# Patient Record
Sex: FEMALE | Race: WHITE | HISPANIC OR LATINO | Employment: UNEMPLOYED | URBAN - METROPOLITAN AREA
[De-identification: names, ages, dates, MRNs, and addresses within clinical notes are randomized per-mention and may not be internally consistent; named-entity substitution may affect disease eponyms.]

---

## 2017-01-11 ENCOUNTER — OFFICE VISIT (OUTPATIENT)
Dept: URGENT CARE | Facility: CLINIC | Age: 32
End: 2017-01-11
Payer: COMMERCIAL

## 2017-01-11 VITALS
HEIGHT: 62 IN | OXYGEN SATURATION: 96 % | DIASTOLIC BLOOD PRESSURE: 64 MMHG | RESPIRATION RATE: 14 BRPM | HEART RATE: 120 BPM | SYSTOLIC BLOOD PRESSURE: 110 MMHG | WEIGHT: 140 LBS | BODY MASS INDEX: 25.76 KG/M2 | TEMPERATURE: 100.5 F

## 2017-01-11 DIAGNOSIS — R51.9 HEADACHE, UNSPECIFIED HEADACHE TYPE: ICD-10-CM

## 2017-01-11 DIAGNOSIS — J10.1 INFLUENZA A: ICD-10-CM

## 2017-01-11 DIAGNOSIS — R50.9 FEVER CHILLS: ICD-10-CM

## 2017-01-11 LAB
FLUAV+FLUBV AG SPEC QL IA: NORMAL
INT CON NEG: NEGATIVE
INT CON POS: POSITIVE

## 2017-01-11 PROCEDURE — 87804 INFLUENZA ASSAY W/OPTIC: CPT | Performed by: FAMILY MEDICINE

## 2017-01-11 PROCEDURE — 99214 OFFICE O/P EST MOD 30 MIN: CPT | Performed by: FAMILY MEDICINE

## 2017-01-11 RX ORDER — ONDANSETRON 4 MG/1
4 TABLET, ORALLY DISINTEGRATING ORAL ONCE
Status: COMPLETED | OUTPATIENT
Start: 2017-01-11 | End: 2017-01-11

## 2017-01-11 RX ORDER — OSELTAMIVIR PHOSPHATE 75 MG/1
75 CAPSULE ORAL 2 TIMES DAILY
Qty: 10 CAP | Refills: 0 | Status: SHIPPED | OUTPATIENT
Start: 2017-01-11 | End: 2017-01-16

## 2017-01-11 RX ORDER — KETOROLAC TROMETHAMINE 30 MG/ML
30 INJECTION, SOLUTION INTRAMUSCULAR; INTRAVENOUS ONCE
Status: COMPLETED | OUTPATIENT
Start: 2017-01-11 | End: 2017-01-11

## 2017-01-11 RX ADMIN — ONDANSETRON 4 MG: 4 TABLET, ORALLY DISINTEGRATING ORAL at 09:00

## 2017-01-11 RX ADMIN — KETOROLAC TROMETHAMINE 30 MG: 30 INJECTION, SOLUTION INTRAMUSCULAR; INTRAVENOUS at 08:52

## 2017-01-11 NOTE — MR AVS SNAPSHOT
"        Margot GonzalezNiya   2017 8:15 AM   Office Visit   MRN: [id number]    Department:  [facility]   Dept Phone:  [phone]    Description:  Female : 1985   Provider:  Skinny Warren M.D.           Reason for Visit     URI uri , symptoms , vomitting x 2 days .      Allergies as of 2017     Allergen Noted Reactions    Bloodless 2014         You were diagnosed with     Influenza A   [375609]       Fever chills   [985625]       Headache, unspecified headache type   [6150764]         Vital Signs     Blood Pressure Pulse Temperature Respirations Height Weight    110/64 mmHg 120 38.1 °C (100.5 °F) 14 1.575 m (5' 2\") 63.504 kg (140 lb)    Body Mass Index Oxygen Saturation Last Menstrual Period Breastfeeding? Smoking Status       25.60 kg/m2 96% 2016 No Never Smoker        Basic Information     Date Of Birth Sex Race Ethnicity Preferred Language    1985 Female White  Origin (Peruvian,Djiboutian,Northern Irish,Jav[place], etc) English      Problem List              ICD-10-CM Priority Class Noted - Resolved    Labor and delivery, indication for care O75.9   2014 - Present    Bronchitis J40   2016 - Present      Health Maintenance        Date Due Completion Dates    IMM DTaP/Tdap/Td Vaccine (1 - Tdap) 2004 ---    PAP SMEAR 2006 ---    IMM INFLUENZA (1) 2016            Results     POCT Influenza A/B      Component    Rapid Influenza A-B    pos a    Internal Control Positive    Positive    Internal Control Negative    Negative                        Current Immunizations     Influenza Vaccine Quad Inj (Pf) 2014 10:15 AM    MMR/Varicella Combined Vaccine  Incomplete    Tdap Vaccine  Incomplete      Below and/or attached are the medications your provider expects you to take. Review all of your home medications and newly ordered medications with your provider and/or pharmacist. Follow medication instructions as directed by your provider " and/or pharmacist. Please keep your medication list with you and share with your provider. Update the information when medications are discontinued, doses are changed, or new medications (including over-the-counter products) are added; and carry medication information at all times in the event of emergency situations     Allergies:  BLOODLESS - (reactions not documented)               Medications  Valid as of: January 11, 2017 -  9:05 AM    Generic Name Brand Name Tablet Size Instructions for use    Acetaminophen   Take  by mouth.        Albuterol Sulfate (Aero Soln) albuterol 108 (90 BASE) MCG/ACT Inhale 2 Puffs by mouth every four hours as needed.        Albuterol Sulfate (Nebu Soln) PROVENTIL 2.5mg/3ml 3 mL by Nebulization route every four hours as needed for Shortness of Breath.        Albuterol Sulfate (Nebu Soln) PROVENTIL 2.5mg/3ml 3 mL by Nebulization route every four hours as needed for Shortness of Breath.        Albuterol Sulfate (Aero Soln) albuterol 108 (90 BASE) MCG/ACT Inhale 2 Puffs by mouth every 6 hours as needed for Shortness of Breath.        Cetirizine HCl (Tab) ZYRTEC 10 MG Take 1 Tab by mouth every day.        Dextromethorphan-Guaifenesin   Take  by mouth.        Ibuprofen (Tab) MOTRIN 600 MG Take 1 Tab by mouth every 6 hours as needed (Cramping).        Loratadine (Tab) CLARITIN 10 MG Take 10 mg by mouth every day.        Naproxen (Tab) NAPROSYN 500 MG Take 1 Tab by mouth 2 times a day, with meals.        Oseltamivir Phosphate (Cap) TAMIFLU 75 MG Take 1 Cap by mouth 2 times a day for 5 days.        Pseudoephedrine-Guaifenesin (TABLET SR 12 HR) Pseudoephedrine-Guaifenesin 120-1200 MG Take 1 Dose by mouth 2 Times a Day.        Pseudoephedrine-Guaifenesin   Take  by mouth.        .                 Medicines prescribed today were sent to:     [id number] - [place] - [address]    [address] [id number]    Phone: [phone] Fax: [phone]    Open 24 Hours?: No       Medication refill instructions:       If your prescription bottle indicates you have medication refills left, it is not necessary to call your provider’s office. Please contact your pharmacy and they will refill your medication.    If your prescription bottle indicates you do not have any refills left, you may request refills at any time through one of the following ways: The online "[employer] system (except Urgent Care), by calling your provider’s office, or by asking your pharmacy to contact your provider’s office with a refill request. Medication refills are processed only during regular business hours and may not be available until the next business day. Your provider may request additional information or to have a follow-up visit with you prior to refilling your medication.   *Please Note: Medication refills are assigned a new Rx number when refilled electronically. Your pharmacy may indicate that no refills were authorized even though a new prescription for the same medication is available at the pharmacy. Please request the medicine by name with the pharmacy before contacting your provider for a refill.           "[employer] Access Code: [email or web address]  Expires: 2/10/2017  9:05 AM    "[employer]  A secure, online tool to manage your health information     [employer]’s "[employer]® is a secure, online tool that connects you to your personalized health information from the privacy of your home -- day or night - making it very easy for you to manage your healthcare. Once the activation process is completed, you can even access your medical information using the "[employer] bryan, which is available for free in the Apple Bryan store or Google Play store.     "[employer] provides the following levels of access (as shown below):   My Chart Features   [facility]  Urgent  Care [facility]  Primary Care  Doctor   Email your healthcare team securely and privately 24/7 X X X    Manage  appointments: schedule your next appointment; view details of past/upcoming appointments X      Request prescription refills. X      View recent personal medical records, including lab and immunizations X X X X   View health record, including health history, allergies, medications X X X X   Read reports about your outpatient visits, procedures, consult and ER notes X X X X   See your discharge summary, which is a recap of your hospital and/or ER visit that includes your diagnosis, lab results, and care plan. X X       How to register for [employer]:  1. Go to  [email or web address].  2. Click on the Sign Up Now box, which takes you to the New Member Sign Up page. You will need to provide the following information:  a. Enter your [employer]oon Access Code exactly as it appears at the top of this page. (You will not need to use this code after you’ve completed the sign-up process. If you do not sign up before the expiration date, you must request a new code.)   b. Enter your date of birth.   c. Enter your home email address.   d. Click Submit, and follow the next screen’s instructions.  3. Create a Balloon ID. This will be your Balloon login ID and cannot be changed, so think of one that is secure and easy to remember.  4. Create a Balloon password. You can change your password at any time.  5. Enter your Password Reset Question and Answer. This can be used at a later time if you forget your password.   6. Enter your e-mail address. This allows you to receive e-mail notifications when new information is available in Balloon.  7. Click Sign Up. You can now view your health information.    For assistance activating your [employer]oon account, call [phone]

## 2017-07-13 ENCOUNTER — OFFICE VISIT (OUTPATIENT)
Dept: MEDICAL GROUP | Age: 32
End: 2017-07-13
Payer: COMMERCIAL

## 2017-07-13 VITALS
WEIGHT: 141.6 LBS | HEART RATE: 77 BPM | HEIGHT: 62 IN | OXYGEN SATURATION: 98 % | DIASTOLIC BLOOD PRESSURE: 70 MMHG | BODY MASS INDEX: 26.06 KG/M2 | TEMPERATURE: 97.9 F | SYSTOLIC BLOOD PRESSURE: 118 MMHG

## 2017-07-13 DIAGNOSIS — J01.10 ACUTE FRONTAL SINUSITIS, RECURRENCE NOT SPECIFIED: ICD-10-CM

## 2017-07-13 DIAGNOSIS — I10 BENIGN ESSENTIAL HTN: ICD-10-CM

## 2017-07-13 DIAGNOSIS — G43.109 MIGRAINE WITH AURA AND WITHOUT STATUS MIGRAINOSUS, NOT INTRACTABLE: ICD-10-CM

## 2017-07-13 DIAGNOSIS — Z23 NEED FOR VACCINATION: ICD-10-CM

## 2017-07-13 DIAGNOSIS — J03.91 RECURRENT TONSILLITIS: ICD-10-CM

## 2017-07-13 PROCEDURE — 99214 OFFICE O/P EST MOD 30 MIN: CPT | Performed by: FAMILY MEDICINE

## 2017-07-13 RX ORDER — SUMATRIPTAN 25 MG/1
25-100 TABLET, FILM COATED ORAL
Qty: 10 TAB | Refills: 3 | Status: SHIPPED | OUTPATIENT
Start: 2017-07-13 | End: 2017-11-13 | Stop reason: SDUPTHER

## 2017-07-13 RX ORDER — LORATADINE 10 MG/1
10 TABLET ORAL DAILY
Qty: 90 TAB | Refills: 0 | Status: SHIPPED | OUTPATIENT
Start: 2017-07-13 | End: 2019-07-01 | Stop reason: CLARIF

## 2017-07-13 NOTE — PROGRESS NOTES
This medical record contains text that has been entered with the assistance of computer voice recognition and dictation software.  Therefore, it may contain unintended errors in text, spelling, punctuation, or grammar    Chief Complaint   Patient presents with   • Other     see reason for visit       Margot Hoang is a 32 y.o. female here evaluation and management of: Recurrent tonsillitis, migraines, seasonal allergies/sinusitis      HPI:     Recurrent tonsillitis  Patient states that she often gets white exudates on bilateral tonsils. This is also causing snoring and difficulty breathing at night. This is also associated with recurrent infections.    Migraine with aura and without status migrainosus, not intractable  Patient states that she began having the sharp headache in the right temporal region which radiated to her eye about a year and a half ago. They are associated with photosensitivity and phonophobia. She also notices white halos. She denies any projectile vomiting, denies any weakness in any extremity, no numbness or tingling anywhere. She stopped taking birth control pills about 6 months ago. She does not smoke cigarettes.    Acute frontal sinusitis  Patient states she gets recurrent bouts of sinusitis, nasal congestion pressure, associated with runny nose bilateral ear fullness, as well as itchy eyes. She states that Zyrtec made her lips swell.    Current medicines (including changes today)  Current Outpatient Prescriptions   Medication Sig Dispense Refill   • SUMAtriptan (IMITREX) 25 MG Tab tablet Take 1-4 Tabs by mouth Once PRN for Migraine for up to 1 dose. 10 Tab 3   • loratadine (CLARITIN) 10 MG Tab Take 1 Tab by mouth every day. 90 Tab 0   • albuterol (VENTOLIN OR PROVENTIL) 108 (90 BASE) MCG/ACT Aero Soln inhalation aerosol Inhale 2 Puffs by mouth every four hours as needed. 1 Inhaler 0   • albuterol (PROVENTIL) 2.5mg/3ml Nebu Soln solution for nebulization 3 mL by Nebulization  "route every four hours as needed for Shortness of Breath. 75 mL 0   • cetirizine (ZYRTEC) 10 MG Tab Take 1 Tab by mouth every day. 14 Tab 0   • albuterol (PROVENTIL) 2.5mg/3ml Nebu Soln solution for nebulization 3 mL by Nebulization route every four hours as needed for Shortness of Breath. 75 mL 0   • Acetaminophen (TYLENOL PO) Take  by mouth.     • Pseudoephedrine-Guaifenesin (MUCINEX D PO) Take  by mouth.     • loratadine (CLARITIN) 10 MG Tab Take 10 mg by mouth every day.     • Dextromethorphan-Guaifenesin (CHEST CONGESTION/COUGH RELIEF PO) Take  by mouth.     • albuterol 108 (90 BASE) MCG/ACT Aero Soln inhalation aerosol Inhale 2 Puffs by mouth every 6 hours as needed for Shortness of Breath. 8.5 g 3   • naproxen (NAPROSYN) 500 MG Tab Take 1 Tab by mouth 2 times a day, with meals. 60 Tab 3   • Pseudoephedrine-Guaifenesin (MUCINEX D) 120-1200 MG TABLET SR 12 HR Take 1 Dose by mouth 2 Times a Day. 28 Tab 0   • ibuprofen (MOTRIN) 600 MG TABS Take 1 Tab by mouth every 6 hours as needed (Cramping). (Patient not taking: Reported on 9/14/2016) 30 Tab 1     No current facility-administered medications for this visit.     She  has a past medical history of ASTHMA.  She  has past surgical history that includes primary c section (1/16/2014).  Social History   Substance Use Topics   • Smoking status: Never Smoker    • Smokeless tobacco: Never Used   • Alcohol Use: No     Social History     Social History Narrative     History reviewed. No pertinent family history.  No family status information on file.         ROS  Please see hpi    All other systems reviewed and are negative     Objective:     Blood pressure 118/70, pulse 77, temperature 36.6 °C (97.9 °F), height 1.575 m (5' 2.01\"), weight 64.229 kg (141 lb 9.6 oz), SpO2 98 %. Body mass index is 25.89 kg/(m^2).  Physical Exam:    Constitutional: Alert, no distress.  Skin: Warm, dry, good turgor, no rashes in visible areas.  Eye: Equal, round and reactive, conjunctiva " clear, lids normal.  ENMT: Lips without lesions, good dentition, oropharynx clear.  Neck: Trachea midline, no masses, no thyromegaly. No cervical or supraclavicular lymphadenopathy.  Respiratory: Unlabored respiratory effort, lungs clear to auscultation, no wheezes, no ronchi.  Cardiovascular: Normal S1, S2, no murmur, no edema.  Abdomen: Soft, non-tender, no masses, no hepatosplenomegaly.  Psych: Alert and oriented x3, normal affect and mood.      Neuro:  CN II-XII checked and intact, DTRs 2+ throughout, intact sensation to light touch, vibration, normal gait, No focal deficits, Romberg Normal, Normal tandem gait, normal heel to shin, normal finger to nose.  Negative frontal release signs. [facility]                    Assessment and Plan:   The following treatment plan was discussed      1. Need for vaccination  Left before getting    - TDAP VACCINE =>8YO IM    2. Migraine with aura and without status migrainosus, not intractable  No clinical indication for neuroimaging    - SUMAtriptan (IMITREX) 25 MG Tab tablet; Take 1-4 Tabs by mouth Once PRN for Migraine for up to 1 dose.  Dispense: 10 Tab; Refill: 3    3. Acute frontal sinusitis, recurrence not specified  I explained the importance of preventing antibiotic resistance  Educated patient that Washing the nasal cavities with saline reduces postnasal drainage, removes secretions, and rinses away allergens and irritants.  Patient was encouraged to use nasal saline rinses prior to using intranasal steroid  Will add Zyrtec and steroid taper   Oral prednisone  20 mg twice daily for five days, followed by 20 mg daily for five days (ie, total of 10 days of treatment).    If symptoms persist will consider antibiotics  And Leukotriene D4 (LTD4) receptor blockers including montelukast or zafirlukast     - loratadine (CLARITIN) 10 MG Tab; Take 1 Tab by mouth every day.  Dispense: 90 Tab; Refill: 0    4. Recurrent tonsillitis    - REFERRAL TO ENT    5. Benign  essential HTN    - COMP METABOLIC PANEL; Future  - CBC WITH DIFFERENTIAL; Future  - LIPID PROFILE; Future      HEALTH MAINTENANCE: due for tdap and pap    Instructed to Follow up in clinic or ER for worsening symptoms, difficulty breathing, lack of expected recovery, or should new symptoms or problems arise.    Followup: Return in about 4 weeks (around 8/10/2017) for Reevaluation.       Once again this medical record contains text that has been entered with the assistance of computer voice recognition and dictation software.  Therefore, it may contain unintended errors in text, spelling, punctuation, or grammar

## 2017-07-13 NOTE — MR AVS SNAPSHOT
"        Margot GonzalezJoshEpi   2017 3:40 PM   Office Visit   MRN: [id number]    Department:  [address] [place] [address]   Dept Phone:  [phone]    Description:  Female : 1985   Provider:  Trina Braun M.D.           Reason for Visit     Other see reason for visit      Allergies as of 2017     Allergen Noted Reactions    Bloodless 2014         You were diagnosed with     Migraine with aura and without status migrainosus, not intractable   [997475]       Recurrent tonsillitis   [965150]       Acute frontal sinusitis, recurrence not specified   [7841325]       Need for vaccination   [782582]       Benign essential HTN   [012737]         Vital Signs     Blood Pressure Pulse Temperature Height Weight Body Mass Index    118/70 mmHg 77 36.6 °C (97.9 °F) 1.575 m (5' 2.01\") 64.229 kg (141 lb 9.6 oz) 25.89 kg/m2    Oxygen Saturation Smoking Status                98% Never Smoker           Basic Information     Date Of Birth Sex Race Ethnicity Preferred Language    1985 Female White  Origin (Khmer,South Sudanese,Austrian,Gibraltarian, etc) English      Problem List              ICD-10-CM Priority Class Noted - Resolved    Labor and delivery, indication for care O75.9   2014 - Present    Bronchitis J40   2016 - Present    Migraine with aura and without status migrainosus, not intractable G43.109   2017 - Present    Acute frontal sinusitis J01.10   2017 - Present    Recurrent tonsillitis J03.91   2017 - Present    Benign essential HTN I10   2017 - Present      Health Maintenance        Date Due Completion Dates    IMM DTaP/Tdap/Td Vaccine (1 - Tdap) 2004 ---    PAP SMEAR 2006 ---    IMM INFLUENZA (1) 2017            Current Immunizations     Influenza Vaccine Quad Inj (Pf) 2014 10:15 AM    MMR/Varicella Combined Vaccine  Incomplete    Tdap Vaccine  Deferred (Patient Schedule),  Incomplete      Below and/or attached are the " medications your provider expects you to take. Review all of your home medications and newly ordered medications with your provider and/or pharmacist. Follow medication instructions as directed by your provider and/or pharmacist. Please keep your medication list with you and share with your provider. Update the information when medications are discontinued, doses are changed, or new medications (including over-the-counter products) are added; and carry medication information at all times in the event of emergency situations     Allergies:  BLOODLESS - (reactions not documented)               Medications  Valid as of: July 13, 2017 -  4:55 PM    Generic Name Brand Name Tablet Size Instructions for use    Acetaminophen   Take  by mouth.        Albuterol Sulfate (Aero Soln) albuterol 108 (90 BASE) MCG/ACT Inhale 2 Puffs by mouth every four hours as needed.        Albuterol Sulfate (Nebu Soln) PROVENTIL 2.5mg/3ml 3 mL by Nebulization route every four hours as needed for Shortness of Breath.        Albuterol Sulfate (Nebu Soln) PROVENTIL 2.5mg/3ml 3 mL by Nebulization route every four hours as needed for Shortness of Breath.        Albuterol Sulfate (Aero Soln) albuterol 108 (90 BASE) MCG/ACT Inhale 2 Puffs by mouth every 6 hours as needed for Shortness of Breath.        Cetirizine HCl (Tab) ZYRTEC 10 MG Take 1 Tab by mouth every day.        Dextromethorphan-Guaifenesin   Take  by mouth.        Ibuprofen (Tab) MOTRIN 600 MG Take 1 Tab by mouth every 6 hours as needed (Cramping).        Loratadine (Tab) CLARITIN 10 MG Take 10 mg by mouth every day.        Loratadine (Tab) CLARITIN 10 MG Take 1 Tab by mouth every day.        Naproxen (Tab) NAPROSYN 500 MG Take 1 Tab by mouth 2 times a day, with meals.        Pseudoephedrine-Guaifenesin (TABLET SR 12 HR) Pseudoephedrine-Guaifenesin 120-1200 MG Take 1 Dose by mouth 2 Times a Day.        Pseudoephedrine-Guaifenesin   Take  by mouth.        SUMAtriptan Succinate (Tab) IMITREX  25 MG Take 1-4 Tabs by mouth Once PRN for Migraine for up to 1 dose.        .                 Medicines prescribed today were sent to:     [id number] - [place], [place] - [address]    [address] [place] [address]    Phone: [phone] Fax: [phone]    Open 24 Hours?: No      Medication refill instructions:       If your prescription bottle indicates you have medication refills left, it is not necessary to call your provider’s office. Please contact your pharmacy and they will refill your medication.    If your prescription bottle indicates you do not have any refills left, you may request refills at any time through one of the following ways: The online [employer] system (except Urgent Care), by calling your provider’s office, or by asking your pharmacy to contact your provider’s office with a refill request. Medication refills are processed only during regular business hours and may not be available until the next business day. Your provider may request additional information or to have a follow-up visit with you prior to refilling your medication.   *Please Note: Medication refills are assigned a new Rx number when refilled electronically. Your pharmacy may indicate that no refills were authorized even though a new prescription for the same medication is available at the pharmacy. Please request the medicine by name with the pharmacy before contacting your provider for a refill.        Your To Do List     Future Labs/Procedures Complete By Expires    CBC WITH DIFFERENTIAL  As directed 7/13/2018    COMP METABOLIC PANEL  As directed 7/13/2018    LIPID PROFILE  As directed 7/13/2018      Referral     A referral request has been sent to our patient care coordination department. Please allow 3-5 business days for us to process this request and contact you either by phone or mail. If you do not hear from us by the 5th business day, please call us at [phone].        Instructions    Cefalea  migrañosa  (Migraine Headache)  Fernando cefalea migrañosa es un dolor intenso y punzante en olive o ambos lados de la emerald. La migraña puede durar desde 30 minutos hasta varias horas.  CAUSAS   No siempre se conoce la causa exacta de la cefalea migrañosa. Sin embargo, pueden aparecer cuando los nervios del cerebro se irritan y liberan ciertas sustancias químicas que causan inflamación. [place] ocasiona dolor.  Existen también ciertos factores que pueden desencadenar las migrañas, maxwell los siguientes:  · Alcohol.  · Fumar.  · Estrés.  · La menstruación  · Quesos añejados.  · Los alimentos o las bebidas que contienen nitratos, glutamato, aspartamo o tiramina.  · Falta de sueño.  · Chocolate.  · Cafeína.  · Hambre.  · Actividad física extenuante.  · Fatiga.  · Medicamentos que se usan para tratar el dolor en el pecho (nitroglicerina), píldoras anticonceptivas, estrógeno y algunos medicamentos para la hipertensión arterial.  SIGNOS Y SÍNTOMAS  · Dolor en olive o ambos lados de la emerald.  · Dolor pulsante o punzante.  · Dolor intenso que impide realizar las actividades diarias.  · Dolor que se agrava por cualquier actividad física.  · Náuseas, vómitos o ambos.  · Mareos.  · Dolor con la exposición a las luces brillantes, a los ruidos santa o la actividad.  · Sensibilidad general a las luces brillantes, a los ruidos santa o a los olores.  Antes de sufrir fernando migraña, puede recibir señales de advertencia (aura). Un aura puede incluir:  · Salvatore las luces intermitentes.  · [place] puntos brillantes, halos o líneas en zigzag.  · Tener fernando visión en túnel o visión borrosa.  · Sensación de entumecimiento u hormigueo.  · Dificultad para hablar  · Debilidad muscular.  DIAGNÓSTICO   La cefalea migrañosa se diagnostica en función de lo siguiente:  · Síntomas.  · Examen físico.  · Fernando TC (tomografía computada) o resonancia magnética de la emerald. Estas pruebas de diagnóstico por imagen no pueden diagnosticar las migrañas, zayda pueden ayudar a  descartar otras causas de las cefaleas.  TRATAMIENTO  Le prescribirán medicamentos para aliviar el dolor y las náuseas. También podrán administrarse medicamentos para ayudar a prevenir las migrañas recurrentes.   INSTRUCCIONES PARA EL CUIDADO EN EL HOGAR  · Sólo tome medicamentos de venta angelique o recetados para calmar el dolor o el malestar, según las indicaciones de snider médico. No se recomienda usar los opiáceos a sandra plazo.  · Cuando tenga la migraña, acuéstese en un cuarto oscuro y tranquilo  · Lleve un registro diario para averiguar lo que puede provocar las cefaleas migrañosas. Por ejemplo, escriba:  ¨ Lo que usted come y josephine.  ¨ Cuánto tiempo duerme.  ¨ Algún cambio en snider dieta o en los medicamentos.  · Limite el consumo de bebidas alcohólicas.  · Si fuma, deje de hacerlo.  · Duerma entre 7 y 9 horas, o según las recomendaciones del médico.  · Limite el estrés.  · Mantenga las luces tenues si le molestan las luces brillantes y la migraña empeora.  SOLICITE ATENCIÓN MÉDICA DE INMEDIATO SI:   · La migraña se hace cada vez más intensa.  · Tiene fiebre.  · Presenta rigidez en el janis.  · Tiene pérdida de visión.  · Presenta debilidad muscular o pérdida del control muscular.  · Comienza a perder el equilibrio o tiene problemas para caminar.  · Sufre mareos o se desmaya.  · Tiene síntomas graves que son diferentes a los primeros síntomas.  ASEGÚRESE DE QUE:   · Comprende estas instrucciones.  · Controlará snider afección.  · Recibirá ayuda de inmediato si no mejora o si empeora.     Esta información no tiene maxwell fin reemplazar el consejo del médico. Asegúrese de hacerle al médico cualquier pregunta que tenga.     Document Released: 12/18/2006 Document Revised: 10/08/2014  Elsevier Interactive Patient Education ©2016 Elsevier Inc.            Evirx Access Code: [email or web address]  Expires: 8/10/2017  4:07 AM    Evirx  A secure, online tool to manage your health information     [employer] is a secure,  online tool that connects you to your personalized health information from the privacy of your home -- day or night - making it very easy for you to manage your healthcare. Once the activation process is completed, you can even access your medical information using the Dengi Online bryan, which is available for free in the [employer] Bryan store or Google Play store.     Dengi Online provides the following levels of access (as shown below):   My Chart Features   [facility] Doctor [facility]  Specialists [facility]  Urgent  Care Non-[facility]  Primary Care  Doctor   Email your healthcare team securely and privately 24/7 X X X    Manage appointments: schedule your next appointment; view details of past/upcoming appointments X      Request prescription refills. X      View recent personal medical records, including lab and immunizations X X X X   View health record, including health history, allergies, medications X X X X   Read reports about your outpatient visits, procedures, consult and ER notes X X X X   See your discharge summary, which is a recap of your hospital and/or ER visit that includes your diagnosis, lab results, and care plan. X X       How to register for Dengi Online:  1. Go to  [email or web address].  2. Click on the Sign Up Now box, which takes you to the New Member Sign Up page. You will need to provide the following information:  a. Enter your Dengi Online Access Code exactly as it appears at the top of this page. (You will not need to use this code after you’ve completed the sign-up process. If you do not sign up before the expiration date, you must request a new code.)   b. Enter your date of birth.   c. Enter your home email address.   d. Click Submit, and follow the next screen’s instructions.  3. Create a Dengi Online ID. This will be your Dengi Online login ID and cannot be changed, so think of one that is secure and easy to remember.  4. Create a Dengi Online password. You can change your password at any time.  5. Enter your  Password Reset Question and Answer. This can be used at a later time if you forget your password.   6. Enter your e-mail address. This allows you to receive e-mail notifications when new information is available in [employer]st.  7. Click Sign Up. You can now view your health information.    For assistance activating your [employer]st account, call [phone]

## 2017-07-13 NOTE — PATIENT INSTRUCTIONS
Cefalea migrañosa  (Migraine Headache)  Fernando cefalea migrañosa es un dolor intenso y punzante en olive o ambos lados de la emerald. La migraña puede durar desde 30 minutos hasta varias horas.  CAUSAS   No siempre se conoce la causa exacta de la cefalea migrañosa. Sin embargo, pueden aparecer cuando los nervios del cerebro se irritan y liberan ciertas sustancias químicas que causan inflamación. [place] ocasiona dolor.  Existen también ciertos factores que pueden desencadenar las migrañas, maxwell los siguientes:  · Alcohol.  · Fumar.  · Estrés.  · La menstruación  · Quesos añejados.  · Los alimentos o las bebidas que contienen nitratos, glutamato, aspartamo o tiramina.  · Falta de sueño.  · Chocolate.  · Cafeína.  · Hambre.  · Actividad física extenuante.  · Fatiga.  · Medicamentos que se usan para tratar el dolor en el pecho (nitroglicerina), píldoras anticonceptivas, estrógeno y algunos medicamentos para la hipertensión arterial.  SIGNOS Y SÍNTOMAS  · Dolor en olive o ambos lados de la emerald.  · Dolor pulsante o punzante.  · Dolor intenso que impide realizar las actividades diarias.  · Dolor que se agrava por cualquier actividad física.  · Náuseas, vómitos o ambos.  · Mareos.  · Dolor con la exposición a las luces brillantes, a los ruidos santa o la actividad.  · Sensibilidad general a las luces brillantes, a los ruidos santa o a los olores.  Antes de sufrir fernando migraña, puede recibir señales de advertencia (aura). Un aura puede incluir:  · Salvatore las luces intermitentes.  · Salvatore puntos brillantes, halos o líneas en zigzag.  · Tener fernando visión en túnel o visión borrosa.  · Sensación de entumecimiento u hormigueo.  · Dificultad para hablar  · Debilidad muscular.  DIAGNÓSTICO   La cefalea migrañosa se diagnostica en función de lo siguiente:  · Síntomas.  · Examen físico.  · Fernando TC (tomografía computada) o resonancia magnética de la emerald. Estas pruebas de diagnóstico por imagen no pueden diagnosticar las migrañas, zayda pueden  ayudar a descartar otras causas de las cefaleas.  TRATAMIENTO  Le prescribirán medicamentos para aliviar el dolor y las náuseas. También podrán administrarse medicamentos para ayudar a prevenir las migrañas recurrentes.   INSTRUCCIONES PARA EL CUIDADO EN EL HOGAR  · Sólo tome medicamentos de venta angelique o recetados para calmar el dolor o el malestar, según las indicaciones de snider médico. No se recomienda usar los opiáceos a sandra plazo.  · Cuando tenga la migraña, acuéstese en un cuarto oscuro y tranquilo  · Lleve un registro diario para averiguar lo que puede provocar las cefaleas migrañosas. Por ejemplo, escriba:  ¨ Lo que usted come y josephine.  ¨ Cuánto tiempo duerme.  ¨ Algún cambio en snider dieta o en los medicamentos.  · Limite el consumo de bebidas alcohólicas.  · Si fuma, deje de hacerlo.  · Duerma entre 7 y 9 horas, o según las recomendaciones del médico.  · Limite el estrés.  · Mantenga las luces tenues si le molestan las luces brillantes y la migraña empeora.  SOLICITE ATENCIÓN MÉDICA DE INMEDIATO SI:   · La migraña se hace cada vez más intensa.  · Tiene fiebre.  · Presenta rigidez en el janis.  · Tiene pérdida de visión.  · Presenta debilidad muscular o pérdida del control muscular.  · Comienza a perder el equilibrio o tiene problemas para caminar.  · Sufre mareos o se desmaya.  · Tiene síntomas graves que son diferentes a los primeros síntomas.  ASEGÚRESE DE QUE:   · Comprende estas instrucciones.  · Controlará snider afección.  · Recibirá ayuda de inmediato si no mejora o si empeora.     Esta información no tiene maxwell fin reemplazar el consejo del médico. Asegúrese de hacerle al médico cualquier pregunta que tenga.     Document Released: 12/18/2006 Document Revised: 10/08/2014  Elsevier Interactive Patient Education ©2016 Elsevier Inc.

## 2017-07-13 NOTE — ASSESSMENT & PLAN NOTE
Patient states that she often gets white exudates on bilateral tonsils. This is also causing snoring and difficulty breathing at night. This is also associated with recurrent infections.

## 2017-07-13 NOTE — ASSESSMENT & PLAN NOTE
Patient states that she began having the sharp headache in the right temporal region which radiated to her eye about a year and a half ago. They are associated with photosensitivity and phonophobia. She also notices white halos. She denies any projectile vomiting, denies any weakness in any extremity, no numbness or tingling anywhere. She stopped taking birth control pills about 6 months ago. She does not smoke cigarettes.

## 2017-07-13 NOTE — ASSESSMENT & PLAN NOTE
Patient states she gets recurrent bouts of sinusitis, nasal congestion pressure, associated with runny nose bilateral ear fullness, as well as itchy eyes. She states that Zyrtec made her lips swell.

## 2017-11-13 ENCOUNTER — OFFICE VISIT (OUTPATIENT)
Dept: URGENT CARE | Facility: CLINIC | Age: 32
End: 2017-11-13
Payer: COMMERCIAL

## 2017-11-13 VITALS
SYSTOLIC BLOOD PRESSURE: 114 MMHG | DIASTOLIC BLOOD PRESSURE: 64 MMHG | RESPIRATION RATE: 14 BRPM | HEIGHT: 62 IN | TEMPERATURE: 98.2 F | HEART RATE: 74 BPM | OXYGEN SATURATION: 97 %

## 2017-11-13 DIAGNOSIS — G43.109 MIGRAINE WITH AURA AND WITHOUT STATUS MIGRAINOSUS, NOT INTRACTABLE: ICD-10-CM

## 2017-11-13 DIAGNOSIS — G43.909 MIGRAINE WITHOUT STATUS MIGRAINOSUS, NOT INTRACTABLE, UNSPECIFIED MIGRAINE TYPE: ICD-10-CM

## 2017-11-13 DIAGNOSIS — R11.2 NAUSEA AND VOMITING, INTRACTABILITY OF VOMITING NOT SPECIFIED, UNSPECIFIED VOMITING TYPE: ICD-10-CM

## 2017-11-13 LAB
APPEARANCE UR: CLEAR
BILIRUB UR STRIP-MCNC: NORMAL MG/DL
COLOR UR AUTO: NORMAL
GLUCOSE UR STRIP.AUTO-MCNC: NORMAL MG/DL
INT CON NEG: NEGATIVE
INT CON POS: POSITIVE
KETONES UR STRIP.AUTO-MCNC: NORMAL MG/DL
LEUKOCYTE ESTERASE UR QL STRIP.AUTO: NORMAL
NITRITE UR QL STRIP.AUTO: NORMAL
PH UR STRIP.AUTO: 7 [PH] (ref 5–8)
POC URINE PREGNANCY TEST: NORMAL
PROT UR QL STRIP: NORMAL MG/DL
RBC UR QL AUTO: NORMAL
SP GR UR STRIP.AUTO: 1.01
UROBILINOGEN UR STRIP-MCNC: NORMAL MG/DL

## 2017-11-13 PROCEDURE — 81025 URINE PREGNANCY TEST: CPT | Performed by: PHYSICIAN ASSISTANT

## 2017-11-13 PROCEDURE — 81002 URINALYSIS NONAUTO W/O SCOPE: CPT | Performed by: PHYSICIAN ASSISTANT

## 2017-11-13 PROCEDURE — 99214 OFFICE O/P EST MOD 30 MIN: CPT | Performed by: PHYSICIAN ASSISTANT

## 2017-11-13 RX ORDER — ONDANSETRON 4 MG/1
4 TABLET, FILM COATED ORAL EVERY 4 HOURS PRN
Qty: 20 TAB | Refills: 0 | Status: SHIPPED | OUTPATIENT
Start: 2017-11-13 | End: 2019-07-01 | Stop reason: CLARIF

## 2017-11-13 RX ORDER — ONDANSETRON 4 MG/1
4 TABLET, ORALLY DISINTEGRATING ORAL ONCE
Status: COMPLETED | OUTPATIENT
Start: 2017-11-13 | End: 2017-11-13

## 2017-11-13 RX ORDER — SUMATRIPTAN 25 MG/1
25-100 TABLET, FILM COATED ORAL
Qty: 10 TAB | Refills: 0 | Status: SHIPPED | OUTPATIENT
Start: 2017-11-13 | End: 2019-07-01 | Stop reason: CLARIF

## 2017-11-13 RX ADMIN — ONDANSETRON 4 MG: 4 TABLET, ORALLY DISINTEGRATING ORAL at 19:25

## 2017-11-13 NOTE — LETTER
November 13, 2017         Patient: Margot Hoang   YOB: 1985   Date of Visit: 11/13/2017           To Whom it May Concern:    Margot Hoang was seen in my clinic on 11/13/2017. Please excuse his absence from 11/13/17-11/14/17.    If you have any questions or concerns, please don't hesitate to call.        Sincerely,           Odalis Szymanski P.A.-C.  Electronically Signed

## 2017-11-14 ASSESSMENT — ENCOUNTER SYMPTOMS
NUMBER OF EPISODES OF EMESIS TODAY: 1
NAUSEA: 0
ABDOMINAL PAIN: 0
VOMITING: 1
DIZZINESS: 0
DIARRHEA: 0
BLOOD IN STOOL: 0
FEVER: 0
CHANGE IN BOWEL HABIT: 1
HEADACHES: 0
MUSCULOSKELETAL NEGATIVE: 1
SHORTNESS OF BREATH: 0
CONSTIPATION: 0
CHILLS: 1

## 2017-11-14 NOTE — PROGRESS NOTES
"Subjective:      Margot Hoang is a 32 y.o. female who presents with Emesis (nvd x 2 days .pt has vomiting x 9 episodes . pt began having diarrhea .  needs work note .)        Patient is accompanied by her .     Emesis   This is a new problem. The current episode started yesterday. The problem occurs constantly. The problem has been unchanged. Associated symptoms include a change in bowel habit, chills and vomiting. Pertinent negatives include no abdominal pain, chest pain, fever, headaches or nausea. The symptoms are aggravated by drinking and eating. She has tried nothing for the symptoms.     Patient presents to urgent care reporting an acute onset of nausea and vomiting last night that has been constant all day today. One episode of \"loose\", non-bloody diarrhea today. No recent travel, recent use of antibiotics, or suspect food intake.     Review of Systems   Constitutional: Positive for chills. Negative for fever.   Respiratory: Negative for shortness of breath.    Cardiovascular: Negative for chest pain.   Gastrointestinal: Positive for change in bowel habit and vomiting. Negative for abdominal pain, blood in stool, constipation, diarrhea and nausea.   Genitourinary: Negative.    Musculoskeletal: Negative.    Neurological: Negative for dizziness and headaches.          Objective:     /64   Pulse 74   Temp 36.8 °C (98.2 °F)   Resp 14   Ht 1.575 m (5' 2\")   LMP 11/06/2017   SpO2 97%   Breastfeeding? No      Physical Exam   Constitutional: She is oriented to person, place, and time. She appears well-developed and well-nourished. No distress.   HENT:   Head: Normocephalic and atraumatic.   Eyes: Pupils are equal, round, and reactive to light.   Neck: Normal range of motion.   Cardiovascular: Normal rate.    Pulmonary/Chest: Effort normal.   Abdominal: Soft. Bowel sounds are normal. She exhibits no distension and no mass. There is no tenderness. There is no guarding.   No CVAT " bilaterally   Musculoskeletal: Normal range of motion.   Neurological: She is alert and oriented to person, place, and time.   Skin: Skin is warm and dry. She is not diaphoretic.   Psychiatric: She has a normal mood and affect. Her behavior is normal.   Nursing note and vitals reviewed.            POCT Urinalysis:  Component Results     Component Value Ref Range & Units Status   POC Color nan Negative Final   POC Appearance clear Negative Final   POC Leukocyte Esterase neg Negative Final   POC Nitrites neg Negative Final   POC Urobiligen mod Negative (0.2) mg/dL Final   POC Protein mod Negative mg/dL Final   POC Urine PH 7.0 5.0 - 8.0 Final   POC Blood tr Negative Final   POC Specific Gravity 1.010 <1.005 - >1.030 Final   POC Ketones mod Negative mg/dL Final   POC Biliruben neg Negative mg/dL Final   POC Glucose neg Negative mg/dL Final   Last Resulted Time   Mon Nov 13, 2017  7:28 PM     Assessment/Plan:     1. Nausea and vomiting, intractability of vomiting not specified, unspecified vomiting type  - POCT Urinalysis --> no evidence of UTI  - POCT Pregnancy --> negative  - ondansetron (ZOFRAN ODT) dispertab 4 mg; Take 1 Tab by mouth Once.   - Given in clinic with mild relief of symptoms  - ondansetron (ZOFRAN) 4 MG Tab tablet; Take 1 Tab by mouth every four hours as needed for Nausea/Vomiting.  Dispense: 20 Tab; Refill: 0\    Advised patient symptoms are most likely viral in etiology, recommend supportive care. Encouraged snacking on small, bland meals. Call or return to office if symptoms persist or worsen. The patient demonstrated a good understanding and agreed with the treatment plan.    2. Migraine without status migrainosus, not intractable, unspecified migraine type  - SUMAtriptan (IMITREX) 25 MG Tab tablet; Take 1-4 Tabs by mouth Once PRN for Migraine for up to 1 dose.  Dispense: 10 Tab; Refill: 0    Patient reports a migraine starting today due to the nausea ad vomiting and is requesting a refill of  imitrex. Small refill given a today's visit, encouraged to follow up with PCP for ongoing management of migraines. The patient demonstrated a good understanding and agreed with the treatment plan.

## 2018-02-21 ENCOUNTER — OFFICE VISIT (OUTPATIENT)
Dept: URGENT CARE | Facility: CLINIC | Age: 33
End: 2018-02-21
Payer: COMMERCIAL

## 2018-02-21 VITALS
OXYGEN SATURATION: 99 % | SYSTOLIC BLOOD PRESSURE: 112 MMHG | DIASTOLIC BLOOD PRESSURE: 74 MMHG | HEIGHT: 62 IN | TEMPERATURE: 99.4 F | RESPIRATION RATE: 16 BRPM | WEIGHT: 142 LBS | BODY MASS INDEX: 26.13 KG/M2 | HEART RATE: 88 BPM

## 2018-02-21 DIAGNOSIS — M62.830 BACK MUSCLE SPASM: ICD-10-CM

## 2018-02-21 DIAGNOSIS — M54.31 SCIATICA OF RIGHT SIDE: ICD-10-CM

## 2018-02-21 PROCEDURE — 99203 OFFICE O/P NEW LOW 30 MIN: CPT | Mod: 25 | Performed by: EMERGENCY MEDICINE

## 2018-02-21 RX ORDER — CYCLOBENZAPRINE HCL 5 MG
5-10 TABLET ORAL 3 TIMES DAILY PRN
Qty: 20 TAB | Refills: 0 | Status: SHIPPED | OUTPATIENT
Start: 2018-02-21 | End: 2019-07-01 | Stop reason: CLARIF

## 2018-02-21 RX ORDER — KETOROLAC TROMETHAMINE 30 MG/ML
30 INJECTION, SOLUTION INTRAMUSCULAR; INTRAVENOUS ONCE
Status: COMPLETED | OUTPATIENT
Start: 2018-02-21 | End: 2018-02-21

## 2018-02-21 RX ORDER — MELOXICAM 7.5 MG/1
7.5 TABLET ORAL DAILY
Qty: 7 TAB | Refills: 0 | Status: SHIPPED | OUTPATIENT
Start: 2018-02-21 | End: 2019-07-01 | Stop reason: CLARIF

## 2018-02-21 RX ADMIN — KETOROLAC TROMETHAMINE 30 MG: 30 INJECTION, SOLUTION INTRAMUSCULAR; INTRAVENOUS at 12:29

## 2018-02-21 ASSESSMENT — ENCOUNTER SYMPTOMS
TINGLING: 0
NUMBNESS: 0
BACK PAIN: 1
PARESIS: 0
FEVER: 0
BOWEL INCONTINENCE: 0
SENSORY CHANGE: 0
FOCAL WEAKNESS: 0
ABDOMINAL PAIN: 0

## 2018-02-21 NOTE — LETTER
February 21, 2018         Patient: Margot Hoang   YOB: 1985   Date of Visit: 2/21/2018           To Whom it May Concern:    Margot Hoang was seen in my clinic on 2/21/2018. She may return to work on 2/24/2018.    If you have any questions or concerns, please don't hesitate to call.        Sincerely,           Hung Cowart M.D.  Electronically Signed

## 2018-02-21 NOTE — PATIENT INSTRUCTIONS
Stop ibuprofen. Begin new meloxicam/Mobic 8 hours after injection medication. She may begin cyclobenzaprine/Flexeril immediately.    Detener el ibuprofeno. Comience a alex nueva Meloxicam / Mobic 8 horas después de la inyección. Sarai puede comenzar cyclobenzaprine / Flexeril inmediatamente.        Muscle Cramps and Spasms  Muscle cramps and spasms are when muscles tighten by themselves. They usually get better within minutes. Muscle cramps are painful. They are usually stronger and last longer than muscle spasms. Muscle spasms may or may not be painful. They can last a few seconds or much longer.  HOME CARE  · Drink enough fluid to keep your pee (urine) clear or pale yellow.  · Massage, stretch, and relax the muscle.  · Use a warm towel, heating pad, or warm shower water on tight muscles.  · Place ice on the muscle if it is tender or in pain.  ¨ Put ice in a plastic bag.  ¨ Place a towel between your skin and the bag.  ¨ Leave the ice on for 15-20 minutes, 03-04 times a day.  · Only take medicine as told by your doctor.  GET HELP RIGHT AWAY IF:   Your cramps or spasms get worse, happen more often, or do not get better with time.  MAKE SURE YOU:  · Understand these instructions.  · Will watch your condition.  · Will get help right away if you are not doing well or get worse.     This information is not intended to replace advice given to you by your health care provider. Make sure you discuss any questions you have with your health care provider.     Document Released: 11/30/2009 Document Revised: 04/14/2014 Document Reviewed: 12/04/2013  [employer] Interactive Patient Education ©2016 [employer]    Calambres y espasmos musculares   (Muscle Cramps and Spasms)   Se denominan calambres y espasmos musculares cuando los músculos se comprimen. Generalmente mejoran en unos minutos. Los calambres musculares son dolorosos. Son más santa y tompkins más que los espasmos musculares. Los espasmos pueden o no ser dolorosos.  Pueden durar algunos segundos o mucho más.  CUIDADOS EN EL HOGAR   · Valarie gran cantidad de líquido para mantener el pis (orina) de annette saima o amarillo pálido.  · Masajee, estire y relaje el músculo.  · Aplique fernando toalla húmeda, fernando almohadilla térmica o moje con agua tibia los músculos acalambrados.  · Coloque fernando bolsa de hielo sobre la elida si le duele o está inflamada.  ¨ Ponga el hielo en fernando bolsa plástica.  ¨ Colóquese fernando toalla entre la piel y la bolsa de hielo.  ¨ Deje el hielo en el lugar sanchez 15 a 20 minutos, 3 a 4 veces por día.  · Sólo tome los medicamentos que le indique el médico.  SOLICITE AYUDA DE INMEDIATO SI:   Los calambres o espasmos empeoran, ocurren con más frecuencia o no mejoran con el tiempo.   ASEGÚRESE DE QUE:   · Comprende estas instrucciones.  · Controlará snider enfermedad.  · Solicitará ayuda de inmediato si no mejora o si empeora.     Esta información no tiene maxwell fin reemplazar el consejo del médico. Asegúrese de hacerle al médico cualquier pregunta que tenga.     Document Released: 03/16/2010 Document Revised: 04/14/2014  Elsevier Interactive Patient Education ©2016 [employer]    Sciatica  Sciatica is pain, weakness, numbness, or tingling along your sciatic nerve. The nerve starts in the lower back and runs down the back of each leg. Nerve damage or certain conditions pinch or put pressure on the sciatic nerve. This causes the pain, weakness, and other discomforts of sciatica.  HOME CARE   · Only take medicine as told by your doctor.  · Apply ice to the affected area for 20 minutes. Do this 3-4 times a day for the first 48-72 hours. Then try heat in the same way.  · Exercise, stretch, or do your usual activities if these do not make your pain worse.  · Go to physical therapy as told by your doctor.  · Keep all doctor visits as told.  · Do not wear high heels or shoes that are not supportive.  · Get a firm mattress if your mattress is too soft to lessen pain and  discomfort.  GET HELP RIGHT AWAY IF:   · You cannot control when you poop (bowel movement) or pee (urinate).  · You have more weakness in your lower back, lower belly (pelvis), butt (buttocks), or legs.  · You have redness or puffiness (swelling) of your back.  · You have a burning feeling when you pee.  · You have pain that gets worse when you lie down.  · You have pain that wakes you from your sleep.  · Your pain is worse than past pain.  · Your pain lasts longer than 4 weeks.  · You are suddenly losing weight without reason.  MAKE SURE YOU:   · Understand these instructions.  · Will watch this condition.  · Will get help right away if you are not doing well or get worse.     This information is not intended to replace advice given to you by your health care provider. Make sure you discuss any questions you have with your health care provider.     Document Released: 09/26/2009 Document Revised: 06/18/2013 Document Reviewed: 04/28/2013  [employer] Interactive Patient Education ©2016 Else[employer] Inc.    Ciática   (Sciatica)   La ciática es el dolor, debilidad, entumecimiento u hormigueo a lo sandra del nervio ciático. El nervio comienza en la elida inferior de la espalda y desciende por la parte posterior de cada pierna. Darlin lesión en los nervios o ciertas enfermedades pueden causar un pellizco o ejercer presión sobre el nervio ciático. [place] causa dolor, debilidad y otras molestias de ciática.   CUIDADOS EN EL HOGAR   · Sólo tome los medicamentos según le indique el médico.  · Aplique hielo en el área afectada sanchez 20 minutos. Repita 3-4 veces al día sanchez las primeras 48-72 horas. Luego intente aplicar calor de la misma manera.  · Bryant ejercicios, elongue o realice kathryn actividades habituales, si no le causan más dolor.  · Concurra a fisioterapia según lo indicado por snider médico.  · Cumpla con las visitas al médico según las indicaciones.  · No use tacones altos o zapatos que no tengan buen apoyo.  · Consiga un colchón  firme si snider colchón es demasiado blando para disminuir el dolor y el malestar.  SOLICITE AYUDA DE INMEDIATO SI:   · No puede controlar snider materia fecal (movimientos intestinales) o el pis orina.  · Siente debilidad en la elida inferior de la espalda, bajo vientre (pelvis), en las nalgas (glúteos).  · Siente irritación o hinchazón (inflamación) en la espalda.  · Tiene sensación de ardor al orinar.  · El dolor empeora cuando se acuesta.  · El dolor lo despierta al dormir.  · Snider dolor es más intenso que en el pasado.  · Los síntomas tompkins más de 4 semanas.  · Pierde peso sin motivo de manera súbita.  ASEGÚRESE DE QUE:   · Comprende estas instrucciones.  · Controlará la enfermedad.  · Solicitará ayuda de inmediato si no mejora o si empeora.     Esta información no tiene maxwell fin reemplazar el consejo del médico. Asegúrese de hacerle al médico cualquier pregunta que tenga.     Document Released: 01/20/2012 Document Revised: 06/18/2013  [employer] Interactive Patient Education ©2016 Else[employer] Inc.

## 2018-02-21 NOTE — PROGRESS NOTES
Subjective:      Margot Hoang is a 33 y.o. female who presents with Low Back Pain (pt states she went to the chiropatric and states her pain started then x 3 days)            Back Pain   This is a new problem. Episode onset: 3 days. The problem occurs daily. The problem has been gradually worsening since onset. The pain is present in the lumbar spine and gluteal. The pain radiates to the right thigh. The pain is severe. The symptoms are aggravated by bending, twisting, sitting and standing. Pertinent negatives include no abdominal pain, bladder incontinence, bowel incontinence, fever, numbness, paresis or tingling. She has tried chiropractic manipulation and NSAIDs for the symptoms. The treatment provided no relief.   Note similar episode a few years ago resolved. No new trauma. Gradually increasing pain at onset; worsened after chiropractic. History and examination facilitated by  as .    Review of Systems   Constitutional: Negative for fever.   Gastrointestinal: Negative for abdominal pain and bowel incontinence.   Genitourinary: Negative for bladder incontinence.        Denies pregnancy or breast-feeding.   Musculoskeletal: Positive for back pain.   Skin: Negative for rash.   Neurological: Negative for tingling, sensory change, focal weakness and numbness.     PMH:  has a past medical history of ASTHMA.  MEDS:   Current Outpatient Prescriptions:   •  meloxicam (MOBIC) 7.5 MG Tab, Take 1 Tab by mouth every day., Disp: 7 Tab, Rfl: 0  •  cyclobenzaprine (FLEXERIL) 5 MG tablet, Take 1-2 Tabs by mouth 3 times a day as needed., Disp: 20 Tab, Rfl: 0  •  ibuprofen (MOTRIN) 600 MG TABS, Take 1 Tab by mouth every 6 hours as needed (Cramping)., Disp: 30 Tab, Rfl: 1  •  ondansetron (ZOFRAN) 4 MG Tab tablet, Take 1 Tab by mouth every four hours as needed for Nausea/Vomiting., Disp: 20 Tab, Rfl: 0  •  SUMAtriptan (IMITREX) 25 MG Tab tablet, Take 1-4 Tabs by mouth Once PRN for Migraine for  up to 1 dose., Disp: 10 Tab, Rfl: 0  •  loratadine (CLARITIN) 10 MG Tab, Take 1 Tab by mouth every day., Disp: 90 Tab, Rfl: 0  •  Acetaminophen (TYLENOL PO), Take  by mouth., Disp: , Rfl:   •  Pseudoephedrine-Guaifenesin (MUCINEX D PO), Take  by mouth., Disp: , Rfl:   •  loratadine (CLARITIN) 10 MG Tab, Take 10 mg by mouth every day., Disp: , Rfl:   •  Dextromethorphan-Guaifenesin (CHEST CONGESTION/COUGH RELIEF PO), Take  by mouth., Disp: , Rfl:   •  albuterol 108 (90 BASE) MCG/ACT Aero Soln inhalation aerosol, Inhale 2 Puffs by mouth every 6 hours as needed for Shortness of Breath., Disp: 8.5 g, Rfl: 3  •  naproxen (NAPROSYN) 500 MG Tab, Take 1 Tab by mouth 2 times a day, with meals., Disp: 60 Tab, Rfl: 3  •  Pseudoephedrine-Guaifenesin (MUCINEX D) 120-1200 MG TABLET SR 12 HR, Take 1 Dose by mouth 2 Times a Day., Disp: 28 Tab, Rfl: 0  •  albuterol (VENTOLIN OR PROVENTIL) 108 (90 BASE) MCG/ACT Aero Soln inhalation aerosol, Inhale 2 Puffs by mouth every four hours as needed., Disp: 1 Inhaler, Rfl: 0  •  albuterol (PROVENTIL) 2.5mg/3ml Nebu Soln solution for nebulization, 3 mL by Nebulization route every four hours as needed for Shortness of Breath., Disp: 75 mL, Rfl: 0  •  cetirizine (ZYRTEC) 10 MG Tab, Take 1 Tab by mouth every day., Disp: 14 Tab, Rfl: 0  •  albuterol (PROVENTIL) 2.5mg/3ml Nebu Soln solution for nebulization, 3 mL by Nebulization route every four hours as needed for Shortness of Breath., Disp: 75 mL, Rfl: 0    Current Facility-Administered Medications:   •  ketorolac (TORADOL) injection 30 mg, 30 mg, Intramuscular, Once, Hung Cowart M.D.  ALLERGIES:   Allergies   Allergen Reactions   • Bloodless      SURGHX:   Past Surgical History:   Procedure Laterality Date   • PRIMARY C SECTION  1/16/2014    Performed by Julian Agrawal M.D. at [facility]     SOCHX:  reports that she has never smoked. She has never used smokeless tobacco. She reports that she does not drink alcohol or use  "drugs.  FH: family history is not on file.       Objective:     /74   Pulse 88   Temp 37.4 °C (99.4 °F)   Resp 16   Ht 1.575 m (5' 2\")   Wt 64.4 kg (142 lb)   SpO2 99%   BMI 25.97 kg/m²      Physical Exam   Constitutional: She appears well-developed and well-nourished. She is cooperative.  Non-toxic appearance. She does not have a sickly appearance. She appears distressed.   Cardiovascular:   Pulses:       Dorsalis pedis pulses are 2+ on the right side, and 2+ on the left side.   No significant pedal edema.   Pulmonary/Chest: Effort normal.   Abdominal: She exhibits no distension. There is no tenderness. There is no CVA tenderness.   Musculoskeletal:        Lumbar back: She exhibits decreased range of motion, tenderness and deformity. She exhibits no bony tenderness, no swelling and no edema.        Back:    Neurological: She is alert. She has normal strength. She displays normal reflexes. No sensory deficit. Coordination normal.   Skin: Skin is dry. No rash noted.   Psychiatric: She has a normal mood and affect.               Assessment/Plan:     1. Sciatica of right side  Ice/heat. Discontinue ibuprofen  - meloxicam (MOBIC) 7.5 MG Tab; Take 1 Tab by mouth every day.  Dispense: 7 Tab; Refill: 0  Advise follow-up with PCP if not resolving.  2. Back muscle spasm  - ketorolac (TORADOL) injection 30 mg; 1 mL by Intramuscular route Once.  - cyclobenzaprine (FLEXERIL) 5 MG tablet; Take 1-2 Tabs by mouth 3 times a day as needed.  Dispense: 20 Tab; Refill: 0      "

## 2018-08-08 ENCOUNTER — HOSPITAL ENCOUNTER (OUTPATIENT)
Dept: LAB | Facility: MEDICAL CENTER | Age: 33
End: 2018-08-08
Attending: OBSTETRICS & GYNECOLOGY
Payer: COMMERCIAL

## 2018-08-08 LAB
ALBUMIN SERPL BCP-MCNC: 4.5 G/DL (ref 3.2–4.9)
ALBUMIN/GLOB SERPL: 1.3 G/DL
ALP SERPL-CCNC: 51 U/L (ref 30–99)
ALT SERPL-CCNC: 12 U/L (ref 2–50)
ANION GAP SERPL CALC-SCNC: 5 MMOL/L (ref 0–11.9)
AST SERPL-CCNC: 12 U/L (ref 12–45)
B-HCG SERPL-ACNC: <0.6 MIU/ML (ref 0–5)
BASOPHILS # BLD AUTO: 0.4 % (ref 0–1.8)
BASOPHILS # BLD: 0.03 K/UL (ref 0–0.12)
BILIRUB SERPL-MCNC: 0.6 MG/DL (ref 0.1–1.5)
BUN SERPL-MCNC: 8 MG/DL (ref 8–22)
C TRACH DNA SPEC QL NAA+PROBE: NEGATIVE
CALCIUM SERPL-MCNC: 9.5 MG/DL (ref 8.5–10.5)
CHLORIDE SERPL-SCNC: 104 MMOL/L (ref 96–112)
CO2 SERPL-SCNC: 27 MMOL/L (ref 20–33)
CREAT SERPL-MCNC: 0.56 MG/DL (ref 0.5–1.4)
EOSINOPHIL # BLD AUTO: 0.33 K/UL (ref 0–0.51)
EOSINOPHIL NFR BLD: 4.8 % (ref 0–6.9)
ERYTHROCYTE [DISTWIDTH] IN BLOOD BY AUTOMATED COUNT: 40 FL (ref 35.9–50)
EST. AVERAGE GLUCOSE BLD GHB EST-MCNC: 111 MG/DL
GLOBULIN SER CALC-MCNC: 3.4 G/DL (ref 1.9–3.5)
GLUCOSE SERPL-MCNC: 103 MG/DL (ref 65–99)
HBA1C MFR BLD: 5.5 % (ref 0–5.6)
HCT VFR BLD AUTO: 40.2 % (ref 37–47)
HGB BLD-MCNC: 13.4 G/DL (ref 12–16)
IMM GRANULOCYTES # BLD AUTO: 0.02 K/UL (ref 0–0.11)
IMM GRANULOCYTES NFR BLD AUTO: 0.3 % (ref 0–0.9)
LYMPHOCYTES # BLD AUTO: 1.49 K/UL (ref 1–4.8)
LYMPHOCYTES NFR BLD: 21.8 % (ref 22–41)
MCH RBC QN AUTO: 30.1 PG (ref 27–33)
MCHC RBC AUTO-ENTMCNC: 33.3 G/DL (ref 33.6–35)
MCV RBC AUTO: 90.3 FL (ref 81.4–97.8)
MONOCYTES # BLD AUTO: 0.4 K/UL (ref 0–0.85)
MONOCYTES NFR BLD AUTO: 5.8 % (ref 0–13.4)
N GONORRHOEA DNA SPEC QL NAA+PROBE: NEGATIVE
NEUTROPHILS # BLD AUTO: 4.58 K/UL (ref 2–7.15)
NEUTROPHILS NFR BLD: 66.9 % (ref 44–72)
NRBC # BLD AUTO: 0 K/UL
NRBC BLD-RTO: 0 /100 WBC
PLATELET # BLD AUTO: 268 K/UL (ref 164–446)
PMV BLD AUTO: 11.2 FL (ref 9–12.9)
POTASSIUM SERPL-SCNC: 4.2 MMOL/L (ref 3.6–5.5)
PROLACTIN SERPL-MCNC: 18.56 NG/ML (ref 2.8–26)
PROT SERPL-MCNC: 7.9 G/DL (ref 6–8.2)
RBC # BLD AUTO: 4.45 M/UL (ref 4.2–5.4)
SODIUM SERPL-SCNC: 136 MMOL/L (ref 135–145)
SPECIMEN SOURCE: NORMAL
T4 FREE SERPL-MCNC: 0.78 NG/DL (ref 0.53–1.43)
TSH SERPL DL<=0.005 MIU/L-ACNC: 0.66 UIU/ML (ref 0.38–5.33)
WBC # BLD AUTO: 6.9 K/UL (ref 4.8–10.8)

## 2018-08-08 PROCEDURE — 84702 CHORIONIC GONADOTROPIN TEST: CPT

## 2018-08-08 PROCEDURE — 84443 ASSAY THYROID STIM HORMONE: CPT

## 2018-08-08 PROCEDURE — 87491 CHLMYD TRACH DNA AMP PROBE: CPT

## 2018-08-08 PROCEDURE — 84439 ASSAY OF FREE THYROXINE: CPT

## 2018-08-08 PROCEDURE — 84146 ASSAY OF PROLACTIN: CPT

## 2018-08-08 PROCEDURE — 85025 COMPLETE CBC W/AUTO DIFF WBC: CPT

## 2018-08-08 PROCEDURE — 80053 COMPREHEN METABOLIC PANEL: CPT

## 2018-08-08 PROCEDURE — 83036 HEMOGLOBIN GLYCOSYLATED A1C: CPT

## 2018-08-08 PROCEDURE — 87591 N.GONORRHOEAE DNA AMP PROB: CPT

## 2018-08-08 PROCEDURE — 36415 COLL VENOUS BLD VENIPUNCTURE: CPT

## 2018-12-31 ENCOUNTER — PATIENT OUTREACH (OUTPATIENT)
Dept: HEALTH INFORMATION MANAGEMENT | Facility: OTHER | Age: 33
End: 2018-12-31

## 2018-12-31 NOTE — PROGRESS NOTES
Pt called and scheduled an appt to establish care with Dr. Cordero, because needs a provider that speaks Macedonian.

## 2019-03-26 ENCOUNTER — APPOINTMENT (OUTPATIENT)
Dept: MEDICAL GROUP | Facility: PHYSICIAN GROUP | Age: 34
End: 2019-03-26
Payer: COMMERCIAL

## 2019-05-20 ENCOUNTER — OFFICE VISIT (OUTPATIENT)
Dept: URGENT CARE | Facility: CLINIC | Age: 34
End: 2019-05-20
Payer: COMMERCIAL

## 2019-05-20 VITALS
RESPIRATION RATE: 16 BRPM | OXYGEN SATURATION: 96 % | HEIGHT: 62 IN | HEART RATE: 89 BPM | DIASTOLIC BLOOD PRESSURE: 72 MMHG | BODY MASS INDEX: 26.13 KG/M2 | TEMPERATURE: 102.2 F | WEIGHT: 142 LBS | SYSTOLIC BLOOD PRESSURE: 106 MMHG

## 2019-05-20 DIAGNOSIS — J03.90 EXUDATIVE TONSILLITIS: ICD-10-CM

## 2019-05-20 DIAGNOSIS — R50.9 FEVER, UNSPECIFIED FEVER CAUSE: ICD-10-CM

## 2019-05-20 LAB
FLUAV+FLUBV AG SPEC QL IA: NEGATIVE
HETEROPH AB SER QL LA: NEGATIVE
INT CON NEG: NEGATIVE
INT CON POS: POSITIVE
S PYO AG THROAT QL: NEGATIVE

## 2019-05-20 PROCEDURE — 99214 OFFICE O/P EST MOD 30 MIN: CPT | Performed by: PHYSICIAN ASSISTANT

## 2019-05-20 PROCEDURE — 87880 STREP A ASSAY W/OPTIC: CPT | Performed by: PHYSICIAN ASSISTANT

## 2019-05-20 PROCEDURE — 86308 HETEROPHILE ANTIBODY SCREEN: CPT | Performed by: PHYSICIAN ASSISTANT

## 2019-05-20 PROCEDURE — 87804 INFLUENZA ASSAY W/OPTIC: CPT | Performed by: PHYSICIAN ASSISTANT

## 2019-05-20 RX ORDER — AMOXICILLIN AND CLAVULANATE POTASSIUM 875; 125 MG/1; MG/1
1 TABLET, FILM COATED ORAL 2 TIMES DAILY
Qty: 14 TAB | Refills: 0 | Status: SHIPPED | OUTPATIENT
Start: 2019-05-20 | End: 2019-05-27

## 2019-05-20 RX ORDER — ALBUTEROL SULFATE 90 UG/1
2 AEROSOL, METERED RESPIRATORY (INHALATION) EVERY 6 HOURS PRN
Qty: 8.5 G | Refills: 0 | Status: SHIPPED | OUTPATIENT
Start: 2019-05-20 | End: 2019-07-01

## 2019-05-20 RX ORDER — ACETAMINOPHEN 500 MG
1000 TABLET ORAL ONCE
Status: COMPLETED | OUTPATIENT
Start: 2019-05-20 | End: 2019-05-20

## 2019-05-20 RX ADMIN — Medication 1000 MG: at 19:23

## 2019-05-20 ASSESSMENT — ENCOUNTER SYMPTOMS
SORE THROAT: 1
STRIDOR: 0
PALPITATIONS: 0
SHORTNESS OF BREATH: 0
WHEEZING: 0
EYE REDNESS: 0
CHILLS: 0
HEMOPTYSIS: 0
HEADACHES: 0
COUGH: 0
FEVER: 1
SPUTUM PRODUCTION: 0
EYE DISCHARGE: 0
EYE PAIN: 0

## 2019-05-21 NOTE — PROGRESS NOTES
Subjective:      Margot Hoang is a 34 y.o. female who presents with Fever (Head ache, body aches and sore throat x 3 days )            Fever    This is a new problem. The current episode started yesterday. The problem occurs constantly. The maximum temperature noted was 102 to 102.9 F. Associated symptoms include a sore throat. Pertinent negatives include no chest pain, congestion, coughing, ear pain, headaches, rash or wheezing. She has tried NSAIDs for the symptoms. The treatment provided mild relief.       Review of Systems   Constitutional: Positive for fever. Negative for chills and malaise/fatigue.   HENT: Positive for sore throat. Negative for congestion, ear discharge and ear pain.    Eyes: Negative for pain, discharge and redness.   Respiratory: Negative for cough, hemoptysis, sputum production, shortness of breath, wheezing and stridor.    Cardiovascular: Negative for chest pain and palpitations.   Skin: Negative for itching and rash.   Neurological: Negative for headaches.   All other systems reviewed and are negative.    PMH:  has a past medical history of ASTHMA.  MEDS:   Current Outpatient Prescriptions:   •  Aspirin-Acetaminophen-Caffeine (EXCEDRIN PO), Take  by mouth., Disp: , Rfl:   •  amoxicillin-clavulanate (AUGMENTIN) 875-125 MG Tab, Take 1 Tab by mouth 2 times a day for 7 days., Disp: 14 Tab, Rfl: 0  •  maalox plus-benadryl-visc lidocaine (MAGIC MOUTHWASH), Gargle and spit 5 mL every 6 hours as needed for pain., Disp: 100 mL, Rfl: 0  •  albuterol 108 (90 Base) MCG/ACT Aero Soln inhalation aerosol, Inhale 2 Puffs by mouth every 6 hours as needed for Shortness of Breath., Disp: 8.5 g, Rfl: 0  •  meloxicam (MOBIC) 7.5 MG Tab, Take 1 Tab by mouth every day. (Patient not taking: Reported on 5/20/2019), Disp: 7 Tab, Rfl: 0  •  cyclobenzaprine (FLEXERIL) 5 MG tablet, Take 1-2 Tabs by mouth 3 times a day as needed. (Patient not taking: Reported on 5/20/2019), Disp: 20 Tab, Rfl: 0  •   ondansetron (ZOFRAN) 4 MG Tab tablet, Take 1 Tab by mouth every four hours as needed for Nausea/Vomiting. (Patient not taking: Reported on 5/20/2019), Disp: 20 Tab, Rfl: 0  •  SUMAtriptan (IMITREX) 25 MG Tab tablet, Take 1-4 Tabs by mouth Once PRN for Migraine for up to 1 dose. (Patient not taking: Reported on 5/20/2019), Disp: 10 Tab, Rfl: 0  •  loratadine (CLARITIN) 10 MG Tab, Take 1 Tab by mouth every day. (Patient not taking: Reported on 5/20/2019), Disp: 90 Tab, Rfl: 0  •  Acetaminophen (TYLENOL PO), Take  by mouth., Disp: , Rfl:   •  Pseudoephedrine-Guaifenesin (MUCINEX D PO), Take  by mouth., Disp: , Rfl:   •  loratadine (CLARITIN) 10 MG Tab, Take 10 mg by mouth every day., Disp: , Rfl:   •  Dextromethorphan-Guaifenesin (CHEST CONGESTION/COUGH RELIEF PO), Take  by mouth., Disp: , Rfl:   •  naproxen (NAPROSYN) 500 MG Tab, Take 1 Tab by mouth 2 times a day, with meals. (Patient not taking: Reported on 5/20/2019), Disp: 60 Tab, Rfl: 3  •  Pseudoephedrine-Guaifenesin (MUCINEX D) 120-1200 MG TABLET SR 12 HR, Take 1 Dose by mouth 2 Times a Day. (Patient not taking: Reported on 5/20/2019), Disp: 28 Tab, Rfl: 0  •  cetirizine (ZYRTEC) 10 MG Tab, Take 1 Tab by mouth every day. (Patient not taking: Reported on 5/20/2019), Disp: 14 Tab, Rfl: 0  •  ibuprofen (MOTRIN) 600 MG TABS, Take 1 Tab by mouth every 6 hours as needed (Cramping). (Patient not taking: Reported on 5/20/2019), Disp: 30 Tab, Rfl: 1    Current Facility-Administered Medications:   •  acetaminophen (TYLENOL) tablet 1,000 mg, 1,000 mg, Oral, Once, Beka Wall P.A.-C.  ALLERGIES:   Allergies   Allergen Reactions   • Bloodless      SURGHX:   Past Surgical History:   Procedure Laterality Date   • PRIMARY C SECTION  1/16/2014    Performed by Julian Agrawal M.D. at [facility]     SOCHX:  reports that she has never smoked. She has never used smokeless tobacco. She reports that she does not drink alcohol or use drugs.  FH: Family history was  "reviewed, no pertinent findings to report  Medications, Allergies, and current problem list reviewed today in Epic       Objective:     /72   Pulse 89   Temp (!) 39 °C (102.2 °F)   Resp 16   Ht 1.575 m (5' 2\")   Wt 64.4 kg (142 lb)   SpO2 96%   BMI 25.97 kg/m²      Physical Exam   Constitutional: She is oriented to person, place, and time. She appears well-developed and well-nourished.  Non-toxic appearance. She does not have a sickly appearance. She does not appear ill. No distress.   HENT:   Head: Normocephalic and atraumatic.   Right Ear: Hearing, tympanic membrane, external ear and ear canal normal.   Left Ear: Hearing, tympanic membrane, external ear and ear canal normal.   Nose: Nose normal.   Mouth/Throat: Uvula is midline and mucous membranes are normal. Oropharyngeal exudate and posterior oropharyngeal erythema present. No posterior oropharyngeal edema or tonsillar abscesses.   Neck: Normal range of motion. Neck supple. No JVD present. No tracheal deviation present. No thyromegaly present.   Cardiovascular: Normal rate, regular rhythm and normal heart sounds.  Exam reveals no gallop and no friction rub.    No murmur heard.  Pulmonary/Chest: Effort normal and breath sounds normal. No stridor. No respiratory distress. She has no wheezes. She has no rales. She exhibits no tenderness.   Lymphadenopathy:     She has cervical adenopathy.   Neurological: She is alert and oriented to person, place, and time.   Skin: Skin is warm and dry.   Psychiatric: She has a normal mood and affect. Her behavior is normal. Judgment and thought content normal.   Vitals reviewed.           Rapid Strep: NEG  Mono Spot: NEG  Rapid Flu: NEG  Assessment/Plan:     1. Exudative tonsillitis  - Will treat based from 4/5 centor criteria  - POCT Rapid Strep A  - POCT Influenza A/B  - POCT Mononucleosis (mono)  - amoxicillin-clavulanate (AUGMENTIN) 875-125 MG Tab; Take 1 Tab by mouth 2 times a day for 7 days.  Dispense: 14 Tab; " Refill: 0  - maalox plus-benadryl-visc lidocaine (MAGIC MOUTHWASH); Gargle and spit 5 mL every 6 hours as needed for pain.  Dispense: 100 mL; Refill: 0  - albuterol 108 (90 Base) MCG/ACT Aero Soln inhalation aerosol; Inhale 2 Puffs by mouth every 6 hours as needed for Shortness of Breath.  Dispense: 8.5 g; Refill: 0  - acetaminophen (TYLENOL) tablet 1,000 mg; Take 2 Tabs by mouth Once.    2. Fever, unspecified fever cause    - amoxicillin-clavulanate (AUGMENTIN) 875-125 MG Tab; Take 1 Tab by mouth 2 times a day for 7 days.  Dispense: 14 Tab; Refill: 0  - maalox plus-benadryl-visc lidocaine (MAGIC MOUTHWASH); Gargle and spit 5 mL every 6 hours as needed for pain.  Dispense: 100 mL; Refill: 0  - albuterol 108 (90 Base) MCG/ACT Aero Soln inhalation aerosol; Inhale 2 Puffs by mouth every 6 hours as needed for Shortness of Breath.  Dispense: 8.5 g; Refill: 0  - acetaminophen (TYLENOL) tablet 1,000 mg; Take 2 Tabs by mouth Once.    Differential diagnosis, natural history, supportive care discussed. Follow-up with primary care provider within 7-10 days, emergency room precautions discussed.  Patient and/or family appears understanding of information.  Handout and review of patients diagnosis and treatment was discussed extensively.

## 2019-05-21 NOTE — PATIENT INSTRUCTIONS
Amigdalitis  (Tonsillitis)  La amigdalitis es fernando infección de la garganta que hace que las amígdalas se tornen pabon, sensibles e hinchadas. Las amígdalas son colecciones de tejido linfático que se encuentran el la elida posterior de la garganta. Cada amígdala tiene grietas (criptas). Ayudan a luchar contra las infecciones de la nariz y la garganta, y a evitar que las infecciones se diseminen a otras partes del cuerpo sanchez los primeros 18 meses de tomeka.  CAUSAS  Por lo general, la causa de la amigdalitis súbita (aguda) es fernando infección por la bacteria estreptococo. La amigdalitis de larga duración (crónica) se produce cuando las criptas de las amígdalas se llenan con trozos de alimentos y bacterias, lo que favorece las infecciones constantes.  SÍNTOMAS  Los síntomas de la amigdalitis son:  · Dolor de garganta con posible dificultad para tragar.  · Placas josephine sobre las amígdalas.  · Fiebre.  · Cansancio.  · Episodios de ronquidos sanchez el sueño, cuando no los tenía anteriormente.  · Pequeños trozos de material valverde amarillento (tonsilolitos), de olor fétido, que de vez en cuando se eliminan al toser o escupir. Los tonsilolitos también pueden causarle mal aliento.  DIAGNÓSTICO  El diagnóstico puede hacerse a través de un examen físico. Se confirma con los resultados de las pruebas de laboratorio, incluido un cultivo de secreciones de la garganta.  TRATAMIENTO  Los objetivos del tratamiento de la amigdalitis son la reducción de la gravedad y duración de los síntomas y prevención de enfermedades asociadas. Los síntomas pueden mejorar con el uso de corticoides para reducir la hinchazón. La amigdalitis bacteriana se puede tratar con medicamentos antibióticos. Generalmente, el tratamiento con medicamentos antibióticos comienza antes de conocerse la causa. Sin embargo, si se determina que la causa no es bacteriana, los medicamentos antibióticos no curarán la enfermedad. Si los ataques de amigdalitis son graves y  frecuentes, el médico le recomendará la cirugía para extirpar las amígdalas (amigdalectomía).  INSTRUCCIONES PARA EL CUIDADO EN EL HOGAR  · Descanse y duerma todo lo posible.  · Valarie abundantes líquidos. Mientras le duela la garganta, consuma alimentos blandos o líquidos, maxwell sorbetes, sopas o bebidas instantáneas.  · [place] helados de agua.  · Puede hacerse gárgaras con líquidos tibios o fríos para suavizar la garganta. Mezcle 1/4 de cucharadita de sal y 1/4 de cucharadita de bicarbonato de sodio en 8 onzas de agua.  SOLICITE ATENCIÓN MÉDICA SI:  · Le aparecen bultos grandes y dolorosos en el janis.  · Aparece fernando erupción cutánea.  · Elimina un esputo dang, marrón amarillento o sanguinolento.  · No puede tragar líquidos o alimentos sanchez 24 horas.  · Nota que solo fernando de las amígdalas está hinchada.  SOLICITE ATENCIÓN MÉDICA DE INMEDIATO SI:  · Presenta algún síntoma nuevo, maxwell vómitos, dolor de emerald intenso, rigidez en el janis, dolor en el pecho, problemas respiratorios o dificultad para tragar.  · Comienza a sentir dolor de garganta más intenso junto con babeo o cambios en la voz.  · Siente un dolor intenso, que no se sangeetha con los medicamentos que le carlos recomendado.  · No puede abrir completamente la boca.  · Siente un dolor intenso, hinchazón o enrojecimiento en el janis.  · Tiene fiebre.  ASEGÚRESE DE QUE:  · Comprende estas instrucciones.  · Controlará snider afección.  · Recibirá ayuda de inmediato si no mejora o si empeora.  Esta información no tiene maxwell fin reemplazar el consejo del médico. Asegúrese de hacerle al médico cualquier pregunta que tenga.  Document Released: 09/27/2006 Document Revised: 12/23/2014 Document Reviewed: 06/06/2014  [employer]vier Interactive Patient Education © 2017 [employer]

## 2019-07-01 ENCOUNTER — OFFICE VISIT (OUTPATIENT)
Dept: MEDICAL GROUP | Facility: MEDICAL CENTER | Age: 34
End: 2019-07-01
Payer: COMMERCIAL

## 2019-07-01 VITALS
SYSTOLIC BLOOD PRESSURE: 114 MMHG | OXYGEN SATURATION: 97 % | TEMPERATURE: 98.4 F | BODY MASS INDEX: 25.76 KG/M2 | HEART RATE: 82 BPM | DIASTOLIC BLOOD PRESSURE: 74 MMHG | WEIGHT: 140 LBS | RESPIRATION RATE: 14 BRPM | HEIGHT: 62 IN

## 2019-07-01 DIAGNOSIS — G44.039 EPISODIC PAROXYSMAL HEMICRANIA, NOT INTRACTABLE: ICD-10-CM

## 2019-07-01 DIAGNOSIS — R73.01 ELEVATED FASTING BLOOD SUGAR: ICD-10-CM

## 2019-07-01 DIAGNOSIS — Z13.21 ENCOUNTER FOR VITAMIN DEFICIENCY SCREENING: ICD-10-CM

## 2019-07-01 DIAGNOSIS — Z86.32 HISTORY OF GESTATIONAL DIABETES: ICD-10-CM

## 2019-07-01 DIAGNOSIS — J30.9 ALLERGIC RHINITIS, UNSPECIFIED SEASONALITY, UNSPECIFIED TRIGGER: ICD-10-CM

## 2019-07-01 DIAGNOSIS — Z13.220 SCREENING, LIPID: ICD-10-CM

## 2019-07-01 PROBLEM — I10 BENIGN ESSENTIAL HTN: Status: RESOLVED | Noted: 2017-07-13 | Resolved: 2019-07-01

## 2019-07-01 PROCEDURE — 99214 OFFICE O/P EST MOD 30 MIN: CPT | Performed by: NURSE PRACTITIONER

## 2019-07-01 RX ORDER — PROMETHAZINE HYDROCHLORIDE 25 MG/1
25 TABLET ORAL EVERY 6 HOURS PRN
Qty: 30 TAB | Refills: 3 | Status: SHIPPED | OUTPATIENT
Start: 2019-07-01 | End: 2021-09-17

## 2019-07-01 RX ORDER — CETIRIZINE HYDROCHLORIDE 10 MG/1
10 TABLET ORAL DAILY
COMMUNITY
End: 2021-09-17

## 2019-07-01 RX ORDER — FLUTICASONE PROPIONATE 50 MCG
2 SPRAY, SUSPENSION (ML) NASAL DAILY
Qty: 1 BOTTLE | Refills: 11 | Status: SHIPPED | OUTPATIENT
Start: 2019-07-01 | End: 2021-09-17

## 2019-07-01 RX ORDER — MONTELUKAST SODIUM 10 MG/1
10 TABLET ORAL DAILY
Qty: 30 TAB | Refills: 11 | Status: SHIPPED | OUTPATIENT
Start: 2019-07-01 | End: 2021-09-17

## 2019-07-01 RX ORDER — SUMATRIPTAN 100 MG/1
50-100 TABLET, FILM COATED ORAL
Qty: 10 TAB | Refills: 3 | Status: SHIPPED | OUTPATIENT
Start: 2019-07-01 | End: 2021-09-17

## 2019-07-01 ASSESSMENT — PATIENT HEALTH QUESTIONNAIRE - PHQ9: CLINICAL INTERPRETATION OF PHQ2 SCORE: 0

## 2019-07-01 NOTE — PROGRESS NOTES
"CC: Establish Care (Strong headaches with vomiting; allergy issues)        HPI:     Margot presents today for the following:    Patient here to establish care.  Transfer from [facility]  All problems are new to me today  Patient's past medical, family, and social history reviewed and placed in Epic today. Immunizations reviewed. Prescriptions-reviewed and updated PRN.    1. Episodic paroxysmal hemicrania, not intractable  Here today for history of approximately 5 years with bimonthly migraines.  Typically the last 2 to 3 days on occasion 4 days.  She does use over-the-counter products similar to Excedrin with some mild relief stating that it \"calms\" the pain but will typically return at the same intensity the next day.  She describes the pain is been unilateral typically behind the right eye.  She states sometimes it will go away with Excedrin and then return on the left side of the head.  Describes it is constant and not pulsating.  She does have associated light sensitivity and sound sensitivity however denies any changes in vision.  When the pain is severe enough she will have associated nausea and vomiting.  As well as some dizziness.    She is never had any imaging done on this in the past.  She denies any changes or worsening in symptoms.  She denies any associated head trauma.  Started after her last pregnancy    Had been prescribed Imitrex 25 mg and states that if she took to it did calm down her headache however it returned the next day or 2 days later.  She had no adverse reaction to it.  Excedrin does irritate her stomach  These are more common the week before her menstruation and midcycle.  An entire bar chocolate will also provoke a migraine    2. Elevated fasting blood sugar/ History of gestational diabetes  Gestational diabetic and her last pregnancy 5 years ago.  She does have a family history of diabetes.  She does not report any polys.  Last A1c last year normal, with a mildly elevated fasting sugar " "102    4. Allergic rhinitis, unspecified seasonality, unspecified trigger  Also complains of difficulty controlling seasonal allergies.  Describes sneezing runny nose nasal congestion.  Will sometimes develop a cough with this.  She is currently taking Zyrtec over-the-counter daily but does not get good control of her symptoms.  She states one year she got a \"vaccine\" for her allergies in the arm.      Current Outpatient Prescriptions   Medication Sig Dispense Refill   • cetirizine (ZYRTEC) 10 MG Tab Take 10 mg by mouth every day.     • promethazine (PHENERGAN) 25 MG Tab Take 1 Tab by mouth every 6 hours as needed for Nausea/Vomiting. 30 Tab 3   • sumatriptan (IMITREX) 100 MG tablet Take 0.5-1 Tabs by mouth Once PRN for Migraine for up to 1 dose. May repeat one dose 2 hours later if HA is still present or return 10 Tab 3   • fluticasone (FLONASE) 50 MCG/ACT nasal spray Spray 2 Sprays in nose every day. 1 Bottle 11   • montelukast (SINGULAIR) 10 MG Tab Take 1 Tab by mouth every day. 30 Tab 11     No current facility-administered medications for this visit.      Social History   Substance Use Topics   • Smoking status: Never Smoker   • Smokeless tobacco: Never Used   • Alcohol use No     I reviewed patients allergies, problem list and medications today in EPIC.    ROS: Any/all pertinent positives listed in the HPI, otherwise all others reviewed are negative today.      /74 (BP Location: Left arm, Patient Position: Sitting, BP Cuff Size: Adult)   Pulse 82   Temp 36.9 °C (98.4 °F) (Temporal)   Resp 14   Ht 1.575 m (5' 2\")   Wt 63.5 kg (140 lb)   SpO2 97%   BMI 25.61 kg/m²     Exam:   Gen: Alert and oriented, No apparent distress. WDWN  Psych: A+Ox3, normal affect and mood  Skin: Warm, dry and intact. Good turgor   No rashes in visible areas.  Eye: Conjunctiva clear, lids normal  ENMT: Lips without lesions, good dentition   Oropharynx clear.  Bilateral TMs unremarkable.  Mild erythema bilateral nasal " turbinates.  No pain with palpation over the frontal or maxillary sinuses bilaterally.  Prominent however not erythematous not exudative tonsils  Neck: No Lymphadenopathy, Thyromegaly, Bruits.   Trachea midline, no masses  Lungs: Clear to auscultation bilaterally, no rales or rhonchi   Unlabored respiratory effort.   CV: Regular rate and rhythm, S1, S2. No murmurs.   No Edema  Ext: No clubbing, cyanosis, edema.       Assessment and Plan.   34 y.o. female with the following issues.    1. Episodic paroxysmal hemicrania, not intractable  Currently asymptomatic.  Trial of Imitrex 100 mg.  Take this at the onset of her symptoms to see if this will completely resolve her migraine.  Possibly a menstrual component.  Reviewed the medications below.  If she is not getting improvement she will follow-up and we can try something different.  We discussed the importance of staying hydrated, small regular meals high in protein and avoiding foods that she knows are triggers for her for example chocolate.  - promethazine (PHENERGAN) 25 MG Tab; Take 1 Tab by mouth every 6 hours as needed for Nausea/Vomiting.  Dispense: 30 Tab; Refill: 3  - sumatriptan (IMITREX) 100 MG tablet; Take 0.5-1 Tabs by mouth Once PRN for Migraine for up to 1 dose. May repeat one dose 2 hours later if HA is still present or return  Dispense: 10 Tab; Refill: 3    2. Elevated fasting blood sugar  Discussed given she has a gestational diabetes history she will always be more likely to develop prediabetes and diabetes.  Which should be monitored yearly.  Labs ordered as below.  Recommend controlling weight and diet  - Comp Metabolic Panel; Future  - HEMOGLOBIN A1C; Future    3. History of gestational diabetes  See above  4. Allergic rhinitis, unspecified seasonality, unspecified trigger  Continue Zyrtec, add Flonase.  If not improving add montelukast that she does have some allergy influence asthma intermittently.  - fluticasone (FLONASE) 50 MCG/ACT nasal spray;  Spray 2 Sprays in nose every day.  Dispense: 1 Bottle; Refill: 11  - montelukast (SINGULAIR) 10 MG Tab; Take 1 Tab by mouth every day.  Dispense: 30 Tab; Refill: 11    5. Encounter for vitamin deficiency screening  Ordered  - VITAMIN D,25 HYDROXY; Future    6. Screening, lipid  Ordered  - Lipid Profile; Future

## 2019-07-06 ENCOUNTER — HOSPITAL ENCOUNTER (OUTPATIENT)
Dept: LAB | Facility: MEDICAL CENTER | Age: 34
End: 2019-07-06
Attending: NURSE PRACTITIONER
Payer: COMMERCIAL

## 2019-07-06 DIAGNOSIS — Z13.21 ENCOUNTER FOR VITAMIN DEFICIENCY SCREENING: ICD-10-CM

## 2019-07-06 DIAGNOSIS — R73.01 ELEVATED FASTING BLOOD SUGAR: ICD-10-CM

## 2019-07-06 DIAGNOSIS — Z13.220 SCREENING, LIPID: ICD-10-CM

## 2019-07-06 LAB
25(OH)D3 SERPL-MCNC: 15 NG/ML (ref 30–100)
ALBUMIN SERPL BCP-MCNC: 4.2 G/DL (ref 3.2–4.9)
ALBUMIN/GLOB SERPL: 1.2 G/DL
ALP SERPL-CCNC: 62 U/L (ref 30–99)
ALT SERPL-CCNC: 14 U/L (ref 2–50)
ANION GAP SERPL CALC-SCNC: 5 MMOL/L (ref 0–11.9)
AST SERPL-CCNC: 11 U/L (ref 12–45)
BILIRUB SERPL-MCNC: 0.4 MG/DL (ref 0.1–1.5)
BUN SERPL-MCNC: 11 MG/DL (ref 8–22)
CALCIUM SERPL-MCNC: 9.7 MG/DL (ref 8.5–10.5)
CHLORIDE SERPL-SCNC: 104 MMOL/L (ref 96–112)
CHOLEST SERPL-MCNC: 216 MG/DL (ref 100–199)
CO2 SERPL-SCNC: 28 MMOL/L (ref 20–33)
CREAT SERPL-MCNC: 0.56 MG/DL (ref 0.5–1.4)
FASTING STATUS PATIENT QL REPORTED: NORMAL
GLOBULIN SER CALC-MCNC: 3.6 G/DL (ref 1.9–3.5)
GLUCOSE SERPL-MCNC: 107 MG/DL (ref 65–99)
HDLC SERPL-MCNC: 44 MG/DL
LDLC SERPL CALC-MCNC: 149 MG/DL
POTASSIUM SERPL-SCNC: 4.1 MMOL/L (ref 3.6–5.5)
PROT SERPL-MCNC: 7.8 G/DL (ref 6–8.2)
SODIUM SERPL-SCNC: 137 MMOL/L (ref 135–145)
TRIGL SERPL-MCNC: 114 MG/DL (ref 0–149)

## 2019-07-06 PROCEDURE — 80061 LIPID PANEL: CPT

## 2019-07-06 PROCEDURE — 36415 COLL VENOUS BLD VENIPUNCTURE: CPT

## 2019-07-06 PROCEDURE — 82306 VITAMIN D 25 HYDROXY: CPT

## 2019-07-06 PROCEDURE — 83036 HEMOGLOBIN GLYCOSYLATED A1C: CPT

## 2019-07-06 PROCEDURE — 80053 COMPREHEN METABOLIC PANEL: CPT

## 2019-07-07 DIAGNOSIS — E78.49 OTHER HYPERLIPIDEMIA: ICD-10-CM

## 2019-07-07 LAB
EST. AVERAGE GLUCOSE BLD GHB EST-MCNC: 114 MG/DL
HBA1C MFR BLD: 5.6 % (ref 0–5.6)

## 2019-08-06 ENCOUNTER — TELEPHONE (OUTPATIENT)
Dept: MEDICAL GROUP | Facility: MEDICAL CENTER | Age: 34
End: 2019-08-06

## 2019-08-06 DIAGNOSIS — J30.9 ALLERGIC RHINITIS, UNSPECIFIED SEASONALITY, UNSPECIFIED TRIGGER: ICD-10-CM

## 2019-08-06 NOTE — TELEPHONE ENCOUNTER
Patient and I had played phone tag between yesterday afternoon & this morning. I was able to finally reach pt & spouse. Patient is calling because of allergy medications. She tried the medications Melinda gave her but they are not working. I clarified the medications she was referring to for allergies, she said the montelukast and fluticasone. Pt states symptoms the same, sneezing a lot, runny nose, nasal congestion.  I told them I would call them back when I had a response about next step, whether follow up appt needed or medication change.

## 2019-11-25 ENCOUNTER — OFFICE VISIT (OUTPATIENT)
Dept: URGENT CARE | Facility: CLINIC | Age: 34
End: 2019-11-25
Payer: COMMERCIAL

## 2019-11-25 VITALS
HEIGHT: 62 IN | WEIGHT: 151 LBS | DIASTOLIC BLOOD PRESSURE: 78 MMHG | TEMPERATURE: 98.9 F | SYSTOLIC BLOOD PRESSURE: 102 MMHG | BODY MASS INDEX: 27.79 KG/M2 | HEART RATE: 71 BPM | RESPIRATION RATE: 16 BRPM | OXYGEN SATURATION: 97 %

## 2019-11-25 DIAGNOSIS — J02.9 PHARYNGITIS, UNSPECIFIED ETIOLOGY: ICD-10-CM

## 2019-11-25 DIAGNOSIS — J03.90 ACUTE TONSILLITIS, UNSPECIFIED ETIOLOGY: ICD-10-CM

## 2019-11-25 DIAGNOSIS — H66.002 ACUTE SUPPURATIVE OTITIS MEDIA OF LEFT EAR WITHOUT SPONTANEOUS RUPTURE OF TYMPANIC MEMBRANE, RECURRENCE NOT SPECIFIED: ICD-10-CM

## 2019-11-25 PROCEDURE — 99214 OFFICE O/P EST MOD 30 MIN: CPT | Performed by: NURSE PRACTITIONER

## 2019-11-25 RX ORDER — ACETAMINOPHEN 325 MG/1
650 TABLET ORAL EVERY 4 HOURS PRN
COMMUNITY
End: 2021-09-17

## 2019-11-25 RX ORDER — AMOXICILLIN 875 MG/1
875 TABLET, COATED ORAL 2 TIMES DAILY
Qty: 20 TAB | Refills: 0 | Status: SHIPPED | OUTPATIENT
Start: 2019-11-25 | End: 2019-12-05

## 2019-11-25 ASSESSMENT — ENCOUNTER SYMPTOMS
NECK PAIN: 1
DIAPHORESIS: 0
SWOLLEN GLANDS: 1
VOMITING: 0
CHILLS: 1
ABDOMINAL PAIN: 0
FEVER: 1
DIARRHEA: 0
SORE THROAT: 1
DIZZINESS: 0
NAUSEA: 0
RHINORRHEA: 0
SENSORY CHANGE: 0
HOARSE VOICE: 0
EYE DISCHARGE: 0
MYALGIAS: 0
SHORTNESS OF BREATH: 0
WEAKNESS: 0
HEADACHES: 1
TROUBLE SWALLOWING: 0
COUGH: 0
STRIDOR: 0
EYE REDNESS: 0

## 2019-11-25 ASSESSMENT — LIFESTYLE VARIABLES: SUBSTANCE_ABUSE: 0

## 2019-11-25 NOTE — PROGRESS NOTES
Subjective:      Margot Hoang is a 34 y.o. female who presents with Pharyngitis (x 3 days (L) Earache )    Reviewed past medical, surgical and family history. Reviewed prescription and OTC medications with patient in electronic health record today      Allergies   Allergen Reactions   • Bloodless            Language Line  Jozef  # [id number]     Pharyngitis    This is a new problem. The current episode started in the past 7 days. The problem has been gradually worsening. Maximum temperature: subjective. The pain is at a severity of 6/10. The pain is moderate. Associated symptoms include ear pain, headaches, neck pain (swollen lymph nodes on left) and swollen glands. Pertinent negatives include no abdominal pain, congestion, coughing, diarrhea, drooling, ear discharge, hoarse voice, plugged ear sensation, shortness of breath, stridor, trouble swallowing or vomiting. Exposure to: unknown. She has tried acetaminophen and gargles for the symptoms. The treatment provided mild relief.   Otalgia    There is pain in both (L>R) ears. The current episode started in the past 7 days. The problem has been rapidly worsening. The pain is at a severity of 5/10. Associated symptoms include headaches, neck pain (swollen lymph nodes on left) and a sore throat. Pertinent negatives include no abdominal pain, coughing, diarrhea, ear discharge, hearing loss, rash, rhinorrhea or vomiting. She has tried NSAIDs and acetaminophen for the symptoms. The treatment provided mild relief.       Review of Systems   Constitutional: Positive for chills, fever and malaise/fatigue. Negative for diaphoresis.   HENT: Positive for ear pain and sore throat. Negative for congestion, drooling, ear discharge, hearing loss, hoarse voice, rhinorrhea and trouble swallowing.    Eyes: Negative for discharge and redness.   Respiratory: Negative for cough, shortness of breath and stridor.    Gastrointestinal: Negative for abdominal pain, diarrhea,  "nausea and vomiting.   Genitourinary: Negative for dysuria and frequency.   Musculoskeletal: Positive for neck pain (swollen lymph nodes on left). Negative for myalgias.   Skin: Negative for rash.   Neurological: Positive for headaches. Negative for dizziness, sensory change and weakness.   Endo/Heme/Allergies: Negative for environmental allergies.   Psychiatric/Behavioral: Negative for substance abuse.          Objective:     /78   Pulse 71   Temp 37.2 °C (98.9 °F)   Resp 16   Ht 1.575 m (5' 2\")   Wt 68.5 kg (151 lb)   SpO2 97%   BMI 27.62 kg/m²      Physical Exam  Vitals signs and nursing note reviewed.   Constitutional:       General: She is not in acute distress.     Appearance: She is well-developed. She is not toxic-appearing.   HENT:      Head: Normocephalic.      Right Ear: Hearing, tympanic membrane, ear canal and external ear normal.      Left Ear: Hearing, tympanic membrane, ear canal and external ear normal.      Nose: Nose normal.      Right Sinus: No frontal sinus tenderness.      Left Sinus: No frontal sinus tenderness.      Mouth/Throat:      Pharynx: Uvula midline. Posterior oropharyngeal erythema present. No oropharyngeal exudate.      Tonsils: No tonsillar abscesses.   Eyes:      General: Lids are normal.      Pupils: Pupils are equal, round, and reactive to light.   Neck:      Musculoskeletal: Full passive range of motion without pain, normal range of motion and neck supple.      Trachea: Trachea and phonation normal.   Cardiovascular:      Rate and Rhythm: Normal rate and regular rhythm.      Pulses: Normal pulses.   Pulmonary:      Effort: Pulmonary effort is normal. No respiratory distress.      Breath sounds: Normal breath sounds.   Abdominal:      Palpations: Abdomen is soft.   Musculoskeletal: Normal range of motion.   Lymphadenopathy:      Head:      Right side of head: Tonsillar adenopathy present.      Left side of head: Tonsillar adenopathy present.      Cervical: No " cervical adenopathy.      Upper Body:      Right upper body: No supraclavicular adenopathy.      Left upper body: No supraclavicular adenopathy.   Skin:     General: Skin is warm and dry.   Neurological:      Mental Status: She is alert and oriented to person, place, and time.      Deep Tendon Reflexes: Reflexes are normal and symmetric.   Psychiatric:         Speech: Speech normal.         Behavior: Behavior normal.                 Assessment/Plan:       1. Acute tonsillitis, unspecified etiology  amoxicillin (AMOXIL) 875 MG tablet   2. Acute suppurative otitis media of left ear without spontaneous rupture of tympanic membrane, recurrence not specified  amoxicillin (AMOXIL) 875 MG tablet   3. Pharyngitis, unspecified etiology         Educated in proper administration of medication(s) ordered today including safety, possible SE, risks, benefits, rationale and alternatives to therapy.     Return to clinic or PCP  5-7 days if current symptoms are not resolving in a satisfactory manner or sooner if new or worsening symptoms occur. Differential diagnosis, natural history, supportive care, and indications for immediate follow-up discussed at length.   Patient was advised of signs and symptoms which would warrant further evaluation and /or emergent evaluation in ER.  Verbalized agreement with this treatment plan and seemed to understand without barriers. Questions were encouraged and answered to patients satisfaction.

## 2019-12-27 ENCOUNTER — OFFICE VISIT (OUTPATIENT)
Dept: URGENT CARE | Facility: CLINIC | Age: 34
End: 2019-12-27
Payer: COMMERCIAL

## 2019-12-27 VITALS
OXYGEN SATURATION: 97 % | SYSTOLIC BLOOD PRESSURE: 110 MMHG | HEART RATE: 116 BPM | DIASTOLIC BLOOD PRESSURE: 76 MMHG | RESPIRATION RATE: 12 BRPM | TEMPERATURE: 99.9 F | HEIGHT: 62 IN | WEIGHT: 151.4 LBS | BODY MASS INDEX: 27.86 KG/M2

## 2019-12-27 DIAGNOSIS — R21 RASH: ICD-10-CM

## 2019-12-27 DIAGNOSIS — J02.9 PHARYNGITIS, UNSPECIFIED ETIOLOGY: ICD-10-CM

## 2019-12-27 PROCEDURE — 99214 OFFICE O/P EST MOD 30 MIN: CPT | Performed by: FAMILY MEDICINE

## 2019-12-27 RX ORDER — AMOXICILLIN 500 MG/1
500 CAPSULE ORAL 3 TIMES DAILY
Qty: 30 CAP | Refills: 0 | Status: SHIPPED | OUTPATIENT
Start: 2019-12-27 | End: 2021-09-17

## 2019-12-27 RX ORDER — PREDNISONE 20 MG/1
20 TABLET ORAL DAILY
Qty: 5 TAB | Refills: 0 | Status: SHIPPED | OUTPATIENT
Start: 2019-12-27 | End: 2020-01-01

## 2019-12-27 RX ORDER — PERMETHRIN 50 MG/G
CREAM TOPICAL
Qty: 1 TUBE | Refills: 0 | Status: SHIPPED | OUTPATIENT
Start: 2019-12-27 | End: 2021-09-17

## 2019-12-27 ASSESSMENT — ENCOUNTER SYMPTOMS
HEADACHES: 0
SHORTNESS OF BREATH: 0
SORE THROAT: 1
ABDOMINAL PAIN: 0
COUGH: 0
DIARRHEA: 0
NAUSEA: 0
VOMITING: 0

## 2019-12-27 NOTE — PROGRESS NOTES
Subjective:     Margot Hoang is a 34 y.o. female who presents for Rash (x 1 week. Pt. said she has a rash from her waist up for 1 week.  Pt. said the only thing new is that she stopped eating pork for 6 months and she started to eat Pork again recently. She ate the meat on Tuesday and the rash )    HPI  Pt presents for evaluation of a new problem   Patient with sore throat and rash for the past 1 week  Pt with rash all over body above the waist for the past 1 week   Rash is spreading and now has a few spots on her legs  Rash looks like raised bumps which are very itchy  Skin feels sensitive and sometimes painful to touch in the areas of the rash  Patient has been scratching large amounts  She is unsure why the rash is present    has similar rash only on his trunk which is improving.  He attributes his rash to spider bites    No new foods, medications, or exposures that she can think of except pork.  Patient had stopped eating pork for about 6 months and recently had pork for the first time since then the day before the rash started  Has sore throat and some pain with swallowing   Sore throat is constant and worsening  Back of the throat feels a little swollen, however no major difficulty eating/swallowing  No cough     Review of Systems   HENT: Positive for sore throat. Negative for congestion.    Respiratory: Negative for cough and shortness of breath.    Cardiovascular: Negative for chest pain.   Gastrointestinal: Negative for abdominal pain, diarrhea, nausea and vomiting.   Skin: Positive for itching and rash.   Neurological: Negative for headaches.       PMH:  has a past medical history of ASTHMA, History of gestational diabetes, and Other hyperlipidemia (7/7/2019). She also has no past medical history of Arrhythmia, Blood transfusion without reported diagnosis, Cancer (HCC), CHF (congestive heart failure) (HCC), Diabetes (HCC), Diabetic neuropathy (HCC), Goiter, Heart attack (HCC), Heart  "murmur, Kidney disease, Seizure (HCC), Stroke (HCC), Thyroid disease, or Urinary tract infection.  MEDS:   Current Outpatient Medications:   •  cetirizine (ZYRTEC) 10 MG Tab, Take 10 mg by mouth every day., Disp: , Rfl:   •  sumatriptan (IMITREX) 100 MG tablet, Take 0.5-1 Tabs by mouth Once PRN for Migraine for up to 1 dose. May repeat one dose 2 hours later if HA is still present or return, Disp: 10 Tab, Rfl: 3  •  fluticasone (FLONASE) 50 MCG/ACT nasal spray, Spray 2 Sprays in nose every day., Disp: 1 Bottle, Rfl: 11  •  montelukast (SINGULAIR) 10 MG Tab, Take 1 Tab by mouth every day., Disp: 30 Tab, Rfl: 11  •  acetaminophen (TYLENOL) 325 MG Tab, Take 650 mg by mouth every four hours as needed., Disp: , Rfl:   •  promethazine (PHENERGAN) 25 MG Tab, Take 1 Tab by mouth every 6 hours as needed for Nausea/Vomiting. (Patient not taking: Reported on 11/25/2019), Disp: 30 Tab, Rfl: 3  ALLERGIES:   Allergies   Allergen Reactions   • Bloodless      SURGHX:   Past Surgical History:   Procedure Laterality Date   • PRIMARY C SECTION  1/16/2014    Performed by Julian Agrawal M.D. at [facility]     SOCHX:  reports that she has never smoked. She has never used smokeless tobacco. She reports that she does not drink alcohol or use drugs.  FH: Family history was reviewed, not contributing to acute illness     Objective:   /76   Pulse (!) 116   Temp 37.7 °C (99.9 °F) (Temporal)   Resp 12   Ht 1.575 m (5' 2\")   Wt 68.7 kg (151 lb 6.4 oz)   SpO2 97%   BMI 27.69 kg/m²     Physical Exam  Constitutional:       General: She is not in acute distress.     Appearance: Normal appearance. She is well-developed. She is not toxic-appearing or diaphoretic.   HENT:      Head: Normocephalic and atraumatic.      Right Ear: Tympanic membrane, ear canal and external ear normal.      Left Ear: Tympanic membrane, ear canal and external ear normal.      Nose: Nose normal.      Mouth/Throat:      Mouth: Mucous membranes are " moist.      Comments: Posterior pharynx moderately erythematous, tonsils 3+, no purulent exudates present, no unilateral swelling, no uvular deviation  Eyes:      General: No scleral icterus.        Right eye: No discharge.         Left eye: No discharge.      Conjunctiva/sclera: Conjunctivae normal.   Neck:      Musculoskeletal: Normal range of motion.      Trachea: No tracheal deviation.   Cardiovascular:      Rate and Rhythm: Normal rate and regular rhythm.   Pulmonary:      Effort: Pulmonary effort is normal. No respiratory distress.      Breath sounds: Normal breath sounds. No stridor. No wheezing, rhonchi or rales.   Skin:     General: Skin is warm and dry.      Findings: No rash.   Neurological:      Mental Status: She is alert and oriented to person, place, and time.   Psychiatric:         Mood and Affect: Mood normal.         Behavior: Behavior normal.         Thought Content: Thought content normal.         Judgment: Judgment normal.         Assessment/Plan:   Assessment    1. Pharyngitis, unspecified etiology  2. Rash  - predniSONE (DELTASONE) 20 MG Tab; Take 1 Tab by mouth every day for 5 days.  Dispense: 5 Tab; Refill: 0  - amoxicillin (AMOXIL) 500 MG Cap; Take 1 Cap by mouth 3 times a day.  Dispense: 30 Cap; Refill: 0  - permethrin (ELIMITE) 5 % Cream; Apply to entire body and leave on overnight.  Wash off in shower the next day.  Dispense: 1 Tube; Refill: 0    Patient is a 34-year-old female with new pharyngitis and rash for the past 6 days.  Recently reintroduce pork into her diet, but no past history of allergies to pork.  No clear history which suggest allergic reaction, however most of the rash appears to be hives and allergic reaction seems high likelihood.  Patient has swollen tonsils and some swelling of the posterior pharynx which could be secondary to allergic reaction, however area is erythematous suggesting more likely infectious etiology.   has similar rash which is self resolving,  however has no pharyngitis or systemic symptoms.  Reviewed pros and cons of further testing versus some empiric treatment and patient prefers to empirically try some treatments to try getting better as fast as possible.  Prednisone should help any allergic reaction component and will help tonsillar swelling.  Amoxicillin will cover most bacterial infections in tonsils.  And will give permethrin to cover for scabies as patient's  has same rash.  Patient was given very strict follow-up precautions and ER precautions if treatments not working or if worsening at all over the next 24-48 hours.  Patient is agreeable to this plan and will follow-up as needed.

## 2019-12-30 ENCOUNTER — HOSPITAL ENCOUNTER (OUTPATIENT)
Dept: LAB | Facility: MEDICAL CENTER | Age: 34
End: 2019-12-30
Attending: NURSE PRACTITIONER
Payer: COMMERCIAL

## 2019-12-30 ENCOUNTER — HOSPITAL ENCOUNTER (OUTPATIENT)
Facility: MEDICAL CENTER | Age: 34
End: 2019-12-30
Attending: NURSE PRACTITIONER
Payer: COMMERCIAL

## 2019-12-30 ENCOUNTER — OFFICE VISIT (OUTPATIENT)
Dept: MEDICAL GROUP | Facility: MEDICAL CENTER | Age: 34
End: 2019-12-30
Payer: COMMERCIAL

## 2019-12-30 VITALS
DIASTOLIC BLOOD PRESSURE: 60 MMHG | BODY MASS INDEX: 27.69 KG/M2 | RESPIRATION RATE: 14 BRPM | SYSTOLIC BLOOD PRESSURE: 104 MMHG | OXYGEN SATURATION: 98 % | TEMPERATURE: 98.5 F | HEART RATE: 95 BPM | HEIGHT: 62 IN

## 2019-12-30 DIAGNOSIS — R21 RASH: ICD-10-CM

## 2019-12-30 DIAGNOSIS — R50.9 FEVER, UNSPECIFIED FEVER CAUSE: ICD-10-CM

## 2019-12-30 DIAGNOSIS — J02.9 SORE THROAT: ICD-10-CM

## 2019-12-30 PROBLEM — J01.10 ACUTE FRONTAL SINUSITIS: Status: RESOLVED | Noted: 2017-07-13 | Resolved: 2019-12-30

## 2019-12-30 LAB
ALBUMIN SERPL BCP-MCNC: 4.6 G/DL (ref 3.2–4.9)
ALBUMIN/GLOB SERPL: 1 G/DL
ALP SERPL-CCNC: 96 U/L (ref 30–99)
ALT SERPL-CCNC: 42 U/L (ref 2–50)
ANION GAP SERPL CALC-SCNC: 8 MMOL/L (ref 0–11.9)
AST SERPL-CCNC: 21 U/L (ref 12–45)
BASOPHILS # BLD AUTO: 0.9 % (ref 0–1.8)
BASOPHILS # BLD: 0.1 K/UL (ref 0–0.12)
BILIRUB SERPL-MCNC: 0.4 MG/DL (ref 0.1–1.5)
BUN SERPL-MCNC: 9 MG/DL (ref 8–22)
CALCIUM SERPL-MCNC: 9.7 MG/DL (ref 8.5–10.5)
CHLORIDE SERPL-SCNC: 99 MMOL/L (ref 96–112)
CO2 SERPL-SCNC: 28 MMOL/L (ref 20–33)
CREAT SERPL-MCNC: 0.69 MG/DL (ref 0.5–1.4)
EOSINOPHIL # BLD AUTO: 0 K/UL (ref 0–0.51)
EOSINOPHIL NFR BLD: 0 % (ref 0–6.9)
ERYTHROCYTE [DISTWIDTH] IN BLOOD BY AUTOMATED COUNT: 40.9 FL (ref 35.9–50)
GLOBULIN SER CALC-MCNC: 4.6 G/DL (ref 1.9–3.5)
GLUCOSE SERPL-MCNC: 97 MG/DL (ref 65–99)
HCT VFR BLD AUTO: 43.8 % (ref 37–47)
HETEROPH AB SER QL LA: NEGATIVE
HGB BLD-MCNC: 14.6 G/DL (ref 12–16)
INT CON NEG: NEGATIVE
INT CON POS: POSITIVE
LYMPHOCYTES # BLD AUTO: 2.91 K/UL (ref 1–4.8)
LYMPHOCYTES NFR BLD: 26.9 % (ref 22–41)
MANUAL DIFF BLD: NORMAL
MCH RBC QN AUTO: 30 PG (ref 27–33)
MCHC RBC AUTO-ENTMCNC: 33.3 G/DL (ref 33.6–35)
MCV RBC AUTO: 89.9 FL (ref 81.4–97.8)
MONOCYTES # BLD AUTO: 1.12 K/UL (ref 0–0.85)
MONOCYTES NFR BLD AUTO: 10.4 % (ref 0–13.4)
MORPHOLOGY BLD-IMP: NORMAL
MYELOCYTES NFR BLD MANUAL: 0.9 %
NEUTROPHILS # BLD AUTO: 6.58 K/UL (ref 2–7.15)
NEUTROPHILS NFR BLD: 60.9 % (ref 44–72)
NRBC # BLD AUTO: 0 K/UL
NRBC BLD-RTO: 0 /100 WBC
PLATELET # BLD AUTO: 257 K/UL (ref 164–446)
PLATELET BLD QL SMEAR: NORMAL
PMV BLD AUTO: 10.4 FL (ref 9–12.9)
POTASSIUM SERPL-SCNC: 4 MMOL/L (ref 3.6–5.5)
PROT SERPL-MCNC: 9.2 G/DL (ref 6–8.2)
RBC # BLD AUTO: 4.87 M/UL (ref 4.2–5.4)
RBC BLD AUTO: NORMAL
SODIUM SERPL-SCNC: 135 MMOL/L (ref 135–145)
WBC # BLD AUTO: 10.8 K/UL (ref 4.8–10.8)

## 2019-12-30 PROCEDURE — 86765 RUBEOLA ANTIBODY: CPT

## 2019-12-30 PROCEDURE — 86308 HETEROPHILE ANTIBODY SCREEN: CPT | Performed by: NURSE PRACTITIONER

## 2019-12-30 PROCEDURE — 87798 DETECT AGENT NOS DNA AMP: CPT

## 2019-12-30 PROCEDURE — 85027 COMPLETE CBC AUTOMATED: CPT

## 2019-12-30 PROCEDURE — 85007 BL SMEAR W/DIFF WBC COUNT: CPT

## 2019-12-30 PROCEDURE — 86735 MUMPS ANTIBODY: CPT

## 2019-12-30 PROCEDURE — 86762 RUBELLA ANTIBODY: CPT

## 2019-12-30 PROCEDURE — 99214 OFFICE O/P EST MOD 30 MIN: CPT | Performed by: NURSE PRACTITIONER

## 2019-12-30 PROCEDURE — 86787 VARICELLA-ZOSTER ANTIBODY: CPT

## 2019-12-30 PROCEDURE — 36415 COLL VENOUS BLD VENIPUNCTURE: CPT

## 2019-12-30 PROCEDURE — 80053 COMPREHEN METABOLIC PANEL: CPT

## 2019-12-30 RX ORDER — HYDROXYZINE PAMOATE 25 MG/1
25 CAPSULE ORAL 3 TIMES DAILY PRN
Qty: 90 CAP | Refills: 0 | Status: SHIPPED | OUTPATIENT
Start: 2019-12-30 | End: 2020-01-22

## 2019-12-31 LAB — MEV RNA SPEC QL NAA+PROBE: NEGATIVE

## 2019-12-31 NOTE — PROGRESS NOTES
"CC: Rash (continued; with fever in mornings. Takes ibuprofen. States this morning her fever was 100.2 F, previously as high as 102 )        HPI:     Margot presents today for the followin. Rash/Fever, unspecified fever cause/Sore throat  Here today following up with urgent care stating that on Wednesday (5 days ago) she began with symptoms of a red itchy rash that started on her face and head in addition to a fever.  Mild sore throat.  Symptoms all began at the same time.  Fevers have ranged between 102.0-100.2 the last of which was earlier today.  Using ibuprofen for fever.  Was seen in urgent care 3 days ago diagnosed with pharyngitis given a prescription for amoxicillin, prednisone.  She is using Benadryl which helps her sleep at night with the itching.  They also gave her permethrin for the rash-which she did use but states she still itchy \"do not think it worked.\"  No one else at home has any similar lesions including her 3 children and .      Denies coryza, cough or conjunctivitis.  No runny nose.  Denies any prodromal symptoms prior to the onset on Wednesday  No recent travel  No known exposure    No documented MMR vaccines in her chart.  I do see that in her last postpartum .  They were ordered by discharging OB, I do not see that they were given however.    Current Outpatient Medications   Medication Sig Dispense Refill   • mupirocin (BACTROBAN) 2 % Ointment Apply 1 Application to affected area(s) 2 times a day as needed (open areas of skin). 30 g 1   • hydrOXYzine pamoate (VISTARIL) 25 MG Cap Take 1 Cap by mouth 3 times a day as needed for Itching. 90 Cap 0   • predniSONE (DELTASONE) 20 MG Tab Take 1 Tab by mouth every day for 5 days. 5 Tab 0   • amoxicillin (AMOXIL) 500 MG Cap Take 1 Cap by mouth 3 times a day. 30 Cap 0   • permethrin (ELIMITE) 5 % Cream Apply to entire body and leave on overnight.  Wash off in shower the next day. 1 Tube 0   • acetaminophen (TYLENOL) 325 MG Tab Take 650 " "mg by mouth every four hours as needed.     • cetirizine (ZYRTEC) 10 MG Tab Take 10 mg by mouth every day.     • promethazine (PHENERGAN) 25 MG Tab Take 1 Tab by mouth every 6 hours as needed for Nausea/Vomiting. (Patient not taking: Reported on 11/25/2019) 30 Tab 3   • sumatriptan (IMITREX) 100 MG tablet Take 0.5-1 Tabs by mouth Once PRN for Migraine for up to 1 dose. May repeat one dose 2 hours later if HA is still present or return 10 Tab 3   • fluticasone (FLONASE) 50 MCG/ACT nasal spray Spray 2 Sprays in nose every day. 1 Bottle 11   • montelukast (SINGULAIR) 10 MG Tab Take 1 Tab by mouth every day. 30 Tab 11     No current facility-administered medications for this visit.      Social History     Tobacco Use   • Smoking status: Never Smoker   • Smokeless tobacco: Never Used   Substance Use Topics   • Alcohol use: No   • Drug use: No     I reviewed patients allergies, problem list and medications today in EPIC.    ROS: Any/all pertinent positives listed in the HPI, otherwise all others reviewed are negative today.      /60 (BP Location: Right arm, Patient Position: Sitting, BP Cuff Size: Adult)   Pulse 95   Temp 36.9 °C (98.5 °F) (Temporal)   Resp 14   Ht 1.575 m (5' 2\")   SpO2 98%   BMI 27.69 kg/m²     Exam:   Gen: Alert and oriented, No apparent distress. WDWN  Psych: A+Ox3, normal affect and mood  Skin: Warm, dry and intact. Good turgor  Erythematous papular/pustular rash most prevalent on her face.  Present in the scalp and on the hairline.  Also present to a smaller extent on her chest abdomen and back, none on the legs.  Tracely present on the bilateral arms.  Many of these are scabbed over likely from scratching.  No drainage.  None appear to be vesicular however she did show me a picture on her phone when it started off vesicular nature.  Some appear almost to be mildly pustular nature.  Eye: Conjunctiva clear, lids normal  ENMT: Lips without lesions, good dentition.  No oral lesions " including the oropharynx and the buccal membranes  Neck: No ant cervical Lymphadenopathy; positive left-sided posterior cervical lymphadenopathy-mild,   Trachea midline, no masses  Lungs: Clear to auscultation bilaterally, no rales or rhonchi   Unlabored respiratory effort.   CV: Regular rate and rhythm, S1, S2. No murmurs.   No Edema  POC mono: negative  Patient was masked in the room    Assessment and Plan.   34 y.o. female with the following issues.    1. Rash/Fever, unspecified fever cause  Concerns for measles.  We did speak with [facility] regarding the case.  She directed me to call [facility].  I spoke with Darlene Montaño () reviewed her symptoms and timeline and she felt that patient likely did not have measles given the lack of the 3 C's--coryza, cough or conjunctivitis.  [facility] picked up the PCR sample specifically to process in their lab.  We did run measles PCR and serology however given the concern with the febrile rash.  We did review that if this is measles she is contagious for 10 to 12 days.  She was given information in Dominican on precautions.   Nam was involved as well in terms of tracking down any possible exposure  - POCT Mononucleosis (mono)  - mupirocin (BACTROBAN) 2 % Ointment; Apply 1 Application to affected area(s) 2 times a day as needed (open areas of skin).  Dispense: 30 g; Refill: 1  - VARICELLA ZOSTER IGG AB; Future  - MEASLES IGG ANTIBODY; Future  - Comp Metabolic Panel; Future  - CBC WITH DIFFERENTIAL; Future  - MEASLES AB IGM; Future      2. Sore throat  negative  - POCT Mononucleosis (mono)

## 2020-01-01 LAB
MEV IGG SER IA-ACNC: 0.21
MEV IGM SER IA-ACNC: 0.4 AU (ref 0–0.79)
MUV IGG SER IA-ACNC: 0.49
RUBV AB SER QL: 135.6 IU/ML
VZV IGG SER IA-ACNC: 0.42

## 2020-01-02 ENCOUNTER — TELEPHONE (OUTPATIENT)
Dept: MEDICAL GROUP | Facility: MEDICAL CENTER | Age: 35
End: 2020-01-02

## 2020-01-02 NOTE — TELEPHONE ENCOUNTER
Please call patient  She speaks Hungarian mostly    She is negative for measles however she is not immune and needs an MMR vaccine    All other labs are normal as well.

## 2020-01-02 NOTE — TELEPHONE ENCOUNTER
[phone] ([place])      services used, Jet ID [id number], left general voicemail that I was calling regarding normal results and requested call back.

## 2020-01-03 NOTE — TELEPHONE ENCOUNTER
services used, Prabhjot ID: [id number], was finally able to reach patient. Results relayed. Patient asked what it was then that she had. I told her that Ольга thought it was a virus. Patient's rash is improving. Once her rash is gone, she will make MA appointment for MMR.

## 2020-01-22 RX ORDER — HYDROXYZINE PAMOATE 25 MG/1
25 CAPSULE ORAL 3 TIMES DAILY PRN
Qty: 90 CAP | Refills: 0 | Status: SHIPPED | OUTPATIENT
Start: 2020-01-22 | End: 2020-02-05

## 2020-02-05 RX ORDER — HYDROXYZINE PAMOATE 25 MG/1
25 CAPSULE ORAL 3 TIMES DAILY PRN
Qty: 270 CAP | Refills: 1 | Status: SHIPPED
Start: 2020-02-05 | End: 2021-09-17

## 2021-07-03 ENCOUNTER — HOSPITAL ENCOUNTER (EMERGENCY)
Facility: MEDICAL CENTER | Age: 36
End: 2021-07-04
Attending: EMERGENCY MEDICINE
Payer: COMMERCIAL

## 2021-07-03 DIAGNOSIS — N39.0 URINARY TRACT INFECTION WITHOUT HEMATURIA, SITE UNSPECIFIED: ICD-10-CM

## 2021-07-03 DIAGNOSIS — M54.50 ACUTE BILATERAL LOW BACK PAIN WITHOUT SCIATICA: ICD-10-CM

## 2021-07-03 PROCEDURE — 99284 EMERGENCY DEPT VISIT MOD MDM: CPT

## 2021-07-03 ASSESSMENT — FIBROSIS 4 INDEX: FIB4 SCORE: 0.45

## 2021-07-04 VITALS
HEART RATE: 83 BPM | WEIGHT: 145 LBS | OXYGEN SATURATION: 98 % | HEIGHT: 62 IN | TEMPERATURE: 97.8 F | DIASTOLIC BLOOD PRESSURE: 55 MMHG | BODY MASS INDEX: 26.68 KG/M2 | SYSTOLIC BLOOD PRESSURE: 109 MMHG | RESPIRATION RATE: 18 BRPM

## 2021-07-04 LAB
APPEARANCE UR: CLEAR
BACTERIA #/AREA URNS HPF: ABNORMAL /HPF
BILIRUB UR QL STRIP.AUTO: NEGATIVE
COLOR UR: YELLOW
EPI CELLS #/AREA URNS HPF: ABNORMAL /HPF
GLUCOSE UR STRIP.AUTO-MCNC: NEGATIVE MG/DL
HCG UR QL: NEGATIVE
HYALINE CASTS #/AREA URNS LPF: ABNORMAL /LPF
KETONES UR STRIP.AUTO-MCNC: ABNORMAL MG/DL
LEUKOCYTE ESTERASE UR QL STRIP.AUTO: ABNORMAL
MICRO URNS: ABNORMAL
NITRITE UR QL STRIP.AUTO: NEGATIVE
PH UR STRIP.AUTO: 6 [PH] (ref 5–8)
PROT UR QL STRIP: NEGATIVE MG/DL
RBC # URNS HPF: ABNORMAL /HPF
RBC UR QL AUTO: NEGATIVE
SP GR UR STRIP.AUTO: 1.01
UROBILINOGEN UR STRIP.AUTO-MCNC: 0.2 MG/DL
WBC #/AREA URNS HPF: ABNORMAL /HPF

## 2021-07-04 PROCEDURE — 81001 URINALYSIS AUTO W/SCOPE: CPT

## 2021-07-04 PROCEDURE — 20552 NJX 1/MLT TRIGGER POINT 1/2: CPT

## 2021-07-04 PROCEDURE — 87086 URINE CULTURE/COLONY COUNT: CPT

## 2021-07-04 PROCEDURE — 87186 SC STD MICRODIL/AGAR DIL: CPT

## 2021-07-04 PROCEDURE — 81025 URINE PREGNANCY TEST: CPT

## 2021-07-04 PROCEDURE — 87077 CULTURE AEROBIC IDENTIFY: CPT | Mod: 91

## 2021-07-04 PROCEDURE — 700111 HCHG RX REV CODE 636 W/ 250 OVERRIDE (IP): Performed by: EMERGENCY MEDICINE

## 2021-07-04 PROCEDURE — 96372 THER/PROPH/DIAG INJ SC/IM: CPT

## 2021-07-04 PROCEDURE — 700102 HCHG RX REV CODE 250 W/ 637 OVERRIDE(OP): Performed by: STUDENT IN AN ORGANIZED HEALTH CARE EDUCATION/TRAINING PROGRAM

## 2021-07-04 PROCEDURE — A9270 NON-COVERED ITEM OR SERVICE: HCPCS | Performed by: EMERGENCY MEDICINE

## 2021-07-04 PROCEDURE — A9270 NON-COVERED ITEM OR SERVICE: HCPCS | Performed by: STUDENT IN AN ORGANIZED HEALTH CARE EDUCATION/TRAINING PROGRAM

## 2021-07-04 PROCEDURE — 700102 HCHG RX REV CODE 250 W/ 637 OVERRIDE(OP): Performed by: EMERGENCY MEDICINE

## 2021-07-04 RX ORDER — KETOROLAC TROMETHAMINE 30 MG/ML
30 INJECTION, SOLUTION INTRAMUSCULAR; INTRAVENOUS ONCE
Status: DISCONTINUED | OUTPATIENT
Start: 2021-07-04 | End: 2021-07-04 | Stop reason: HOSPADM

## 2021-07-04 RX ORDER — NITROFURANTOIN 25; 75 MG/1; MG/1
100 CAPSULE ORAL 2 TIMES DAILY
Qty: 10 CAPSULE | Refills: 0 | Status: SHIPPED | OUTPATIENT
Start: 2021-07-04 | End: 2021-07-09

## 2021-07-04 RX ORDER — OXYCODONE HYDROCHLORIDE AND ACETAMINOPHEN 5; 325 MG/1; MG/1
1 TABLET ORAL ONCE
Status: COMPLETED | OUTPATIENT
Start: 2021-07-04 | End: 2021-07-04

## 2021-07-04 RX ORDER — KETOROLAC TROMETHAMINE 30 MG/ML
30 INJECTION, SOLUTION INTRAMUSCULAR; INTRAVENOUS ONCE
Status: COMPLETED | OUTPATIENT
Start: 2021-07-04 | End: 2021-07-04

## 2021-07-04 RX ORDER — LIDOCAINE 50 MG/G
1-2 PATCH TOPICAL EVERY 24 HOURS
Qty: 30 PATCH | Refills: 0 | Status: SHIPPED | OUTPATIENT
Start: 2021-07-04 | End: 2021-09-17

## 2021-07-04 RX ORDER — ACETAMINOPHEN 325 MG/1
650 TABLET ORAL ONCE
Status: COMPLETED | OUTPATIENT
Start: 2021-07-04 | End: 2021-07-04

## 2021-07-04 RX ORDER — NITROFURANTOIN 25; 75 MG/1; MG/1
100 CAPSULE ORAL ONCE
Status: COMPLETED | OUTPATIENT
Start: 2021-07-04 | End: 2021-07-04

## 2021-07-04 RX ADMIN — OXYCODONE HYDROCHLORIDE AND ACETAMINOPHEN 1 TABLET: 5; 325 TABLET ORAL at 00:41

## 2021-07-04 RX ADMIN — KETOROLAC TROMETHAMINE 30 MG: 60 INJECTION, SOLUTION INTRAMUSCULAR at 02:42

## 2021-07-04 RX ADMIN — NITROFURANTOIN MONOHYDRATE/MACROCRYSTALLINE 100 MG: 25; 75 CAPSULE ORAL at 02:48

## 2021-07-04 RX ADMIN — ACETAMINOPHEN 650 MG: 325 TABLET, FILM COATED ORAL at 00:41

## 2021-07-04 ASSESSMENT — LIFESTYLE VARIABLES: DO YOU DRINK ALCOHOL: NO

## 2021-07-04 NOTE — DISCHARGE INSTRUCTIONS
You were seen in the emergency department for back pain.  Your physical exam was reassuring.  You were treated with medications in the emergency department, which helped improved your symptoms.  Your back pain will probably last for several weeks.  Please tried to do moderate activities.  Avoid any heavy lifting or strenuous activities.    You are being sent home with medications to help treat your back pain.      For pain you can take acetaminophen (Tylenol), 1000mg every 8 hours as needed for pain. Do not take more than 3000mg of acetaminophen in any 24 hour period. You can also take  ibuprofen (Motrin), 600mg every 6 hours as needed for pain (take with food to avoid GI upset).  Taking these medications regularly during the day can be very effective in controlling pain.    You are also noted to have a possible urinary tract infection.  This was sent for culture.  Please take the antibiotics as prescribed.    If your pain becomes worse, or you develop numbness, tingling, weakness in your legs,, fevers, flank pain applier, or you have difficulty controlling your bowel or bladder movements, please seek immediate medical attention.

## 2021-07-04 NOTE — ED NOTES
MD at bedside to perform local nerve block. Urine sample deferred to wait for localized numbing to take affect.

## 2021-07-04 NOTE — ED NOTES
"Pt reports she was lifting an object yesterday and felt a \"pop\" to her lower back, reports pain started originally form R hip down RLE now pain is bilateral, denies loss of bowel or bladder.   "

## 2021-07-04 NOTE — ED TRIAGE NOTES
"Chief Complaint   Patient presents with   • Low Back Pain     pain started yesterday in low back and radiated down right leg but now it's radiating down both legs     C/o severe pain, said she's had this before but not this bad. Has tried to take advil but didn't help with the pain.    /82   Pulse 84   Temp 37 °C (98.6 °F) (Temporal)   Resp 18   Ht 1.575 m (5' 2\")   Wt 65.8 kg (145 lb)   SpO2 95%   BMI 26.52 kg/m²     "

## 2021-07-04 NOTE — ED PROVIDER NOTES
"ED Provider Note    Scribed for Dale Moore M.D. by Tom Bolden. 7/4/2021,  12:12 AM.    Means of Arrival: Walk-in  History obtained from: Patient  History limited by: None    CHIEF COMPLAINT  Chief Complaint   Patient presents with   • Low Back Pain     pain started yesterday in low back and radiated down right leg but now it's radiating down both legs       [facility]  Margot Hoang is a 36 y.o. female who presents to the Emergency Department for right lower back pain onset yesterday morning. She reports the pain first radiated up the back and down the right leg, but now radiates bilaterally. She reports the pain occurred when she bent over to pick something up. When she first experienced the pain, she felt \"a shock\". The patient has a past medical history of Sciatica, that was first diagnosed four years ago. She states that she has experienced these episodes of pain intermittently since, but never this bad. She states that the pain is present whenever she is not lying on her back. She denies associated fever, nausea, vomiting, constipation, or diarrhea. She notes doing any activity exacerbates the pain. She does not take any medication for the pain.     REVIEW OF SYSTEMS  CONSTITUTIONAL:  No fever. No nausea,   CARDIOVASCULAR:  No chest discomfort.  RESPIRATORY:  No pleuritic chest pain.  GASTROINTESTINAL:  No abdominal pain. No vomiting. No constipation. No diarrhea.  MUSCULOSKELETAL:  No arthralgia. Right lower back pain, radiating up the back and down the right leg.   SKIN:  No rash or suspicious lesions.  NEUROLOGIC:  No headache.  See HPI for further details.   All other systems are negative.     PAST MEDICAL HISTORY  Past Medical History:   Diagnosis Date   • ASTHMA     since childhood; RAD vs allergies   • History of gestational diabetes    • Other hyperlipidemia 7/7/2019       FAMILY HISTORY  Family History   Problem Relation Age of Onset   • Asthma Mother    • Hypertension Mother    • Other " "Sister         migraine   • Diabetes Maternal Grandmother        SOCIAL HISTORY   reports that she has never smoked. She has never used smokeless tobacco. She reports that she does not drink alcohol and does not use drugs.    SURGICAL HISTORY  Past Surgical History:   Procedure Laterality Date   • PRIMARY C SECTION  1/16/2014    Performed by Julian Agrawal M.D. at [facility] DEL[facility]       CURRENT MEDICATIONS  Home Medications     Reviewed by Kassy Leiva R.N. (Registered Nurse) on 07/03/21 at 2231  Med List Status: Not Addressed   Medication Last Dose Status   acetaminophen (TYLENOL) 325 MG Tab  Active   amoxicillin (AMOXIL) 500 MG Cap  Active   cetirizine (ZYRTEC) 10 MG Tab  Active   fluticasone (FLONASE) 50 MCG/ACT nasal spray  Active   hydrOXYzine pamoate (VISTARIL) 25 MG Cap  Active   montelukast (SINGULAIR) 10 MG Tab  Active   mupirocin (BACTROBAN) 2 % Ointment  Active   permethrin (ELIMITE) 5 % Cream  Active   promethazine (PHENERGAN) 25 MG Tab  Active   sumatriptan (IMITREX) 100 MG tablet  Active                ALLERGIES  Allergies   Allergen Reactions   • Bloodless        PHYSICAL EXAM  VITAL SIGNS: /67   Pulse 68   Temp 37 °C (98.6 °F) (Temporal)   Resp 18   Ht 1.575 m (5' 2\")   Wt 65.8 kg (145 lb)   SpO2 100%   BMI 26.52 kg/m²    Gen: Alert, no acute distress  HEENT: ATNC  Eyes: PERRL, EOMI, normal conjunctiva.   Neck: trachea midline  Resp: no respiratory distress  CV: No JVD  Abd: non-distended  Ext: No deformities  Psych: normal mood  Neuro: speech fluent. bilateral straight leg test positive. Tenderness to palpation on the lumbar spine. Tenderness to palpation rightt buttocks. No decreased sensations    DIAGNOSTIC STUDIES / PROCEDURES     Trigger Point Injection Procedure Note    Indication: Severe pain on the right side    Procedure: The patient was placed in the appropriate position and the area over the trigger point was prepped with chlorhexidine. Injection was performed into " the trigger point area using 10 mL 0.5% of Bupivacaine with epi.     The patient tolerated the procedure well.    Complications: None     Trigger Point Injection Procedure Note    Indication: Severe pain on the left side    Procedure: The patient was placed in the appropriate position and the area over the trigger point was prepped with chlorhexidine. Injection was performed into the trigger point area using 10 mL 0.5 % of bupivacaine with epinephrine .     The patient tolerated the procedure well.    Complications: None      LABS  Labs Reviewed   URINALYSIS - Abnormal; Notable for the following components:       Result Value    Ketones Trace (*)     Leukocyte Esterase Trace (*)     All other components within normal limits   URINE MICROSCOPIC (W/UA) - Abnormal; Notable for the following components:    WBC 5-10 (*)     RBC 5-10 (*)     Bacteria Many (*)     All other components within normal limits   HCG QUALITATIVE UR   URINE CULTURE-EXISTING-LESS THAN 48 HOURS     All labs reviewed by me.    COURSE & MEDICAL DECISION MAKING  Pertinent Labs & Imaging studies reviewed. (See chart for details)    12:12 AM Patient seen and examined at bedside. Ordered for labs and imaging to evaluate. Patient will be treated with Percocet, Tylenol, and Toradol for her symptoms.     1:09 AM - Patient was reevaluated at bedside. Compared notes with Resident. Patient reports she has history of asthma. Discussed lab results with the patient. Performed trigger point injection. See above for details.    Medical Decision Making:  Patient presents with low back pain.  No hard signs for cauda equina syndrome.  She does have some minimal suprapubic tenderness but nonsurgical abdomen.  The patient is tolerating oral intake prior to arrival.  No evidence of fever.  Low suspicion for intra-abdominal infection.  Patient was agreeable to trigger point injection, which improved her symptoms on the right side but then she complained of pain on the  left side.  This was then treated with trigger point injection with good effect.  Patient's urinalysis does show some concern for bacteria and white and red blood cells.  She denies any dysuria but does have some mild suprapubic tenderness concerning for possible cystitis.  No clinical evidence for pyelonephritis.  Patient will be treated with Macrobid for this.  Patient was given return precautions, anticipatory guidance, and the opportunist questions prior to discharge      DISPOSITION:  Patient will be discharged home in stable condition.    FOLLOW UP:  A referral order was placed to establish care with a primary care provider    OUTPATIENT MEDICATIONS:  Discharge Medication List as of 7/4/2021  2:46 AM      START taking these medications    Details   nitrofurantoin (MACROBID) 100 MG Cap Take 1 capsule by mouth 2 times a day for 5 days., Disp-10 capsule, R-0, Normal      lidocaine (LIDODERM) 5 % Patch Place 1-2 Patches on the skin every 24 hours. Apply to site of pain. Wear for 12 hours, then remove for 12 hours, Disp-30 Patch, R-0, Normal               FINAL IMPRESSION  1. Acute bilateral low back pain without sciatica    2. Urinary tract infection without hematuria, site unspecified         Trigger point injection on the right and left side performed.     Trigger Point Injection procedure performed.   Tom MEDINA (Scribe), am scribing for, and in the presence of, Dale Moore M.D..    Electronically signed by: Tom Bolden (Scribe), 7/4/2021    Dale MEDINA M.D. personally performed the services described in this documentation, as scribed by Tom Bolden in my presence, and it is both accurate and complete.    The note accurately reflects work and decisions made by me.  Dale Moore M.D.  7/4/2021  4:33 AM      This dictation was created using voice recognition software. The accuracy of the dictation is limited to the abilities of the software. I expect there may be some errors of grammar and possibly  content. The nursing notes were reviewed and certain aspects of this information were incorporated into this note.

## 2021-07-04 NOTE — ED NOTES
MD at bedside. Patient reporting nausea from not eating or drinking anything all day. Patient provided juice and crackers. Patient refusing nausea medication at this time.

## 2021-07-04 NOTE — ED NOTES
Patient provided discharge instructions. Patient verbalized understanding. Patient leaving ER in stable condition. Patient ambulatory with steady gait. Wristband removed.

## 2021-07-04 NOTE — ED NOTES
Attempted to ambulate patient to bathroom. Patient reports pain has decreased dramatically on right side, but persists on left side. MD at bedside to perform trigger point injection on left lower back. Urine sample deferred to wait for injection to take affect.

## 2021-07-06 LAB
BACTERIA UR CULT: ABNORMAL
BACTERIA UR CULT: ABNORMAL
SIGNIFICANT IND 70042: ABNORMAL
SITE SITE: ABNORMAL
SOURCE SOURCE: ABNORMAL

## 2021-09-17 ENCOUNTER — OFFICE VISIT (OUTPATIENT)
Dept: MEDICAL GROUP | Facility: PHYSICIAN GROUP | Age: 36
End: 2021-09-17
Payer: COMMERCIAL

## 2021-09-17 VITALS
TEMPERATURE: 98.2 F | SYSTOLIC BLOOD PRESSURE: 120 MMHG | BODY MASS INDEX: 28.01 KG/M2 | HEART RATE: 79 BPM | DIASTOLIC BLOOD PRESSURE: 82 MMHG | WEIGHT: 152.2 LBS | OXYGEN SATURATION: 98 % | HEIGHT: 62 IN

## 2021-09-17 DIAGNOSIS — H72.93 RUPTURED TYMPANIC MEMBRANE, BILATERAL: ICD-10-CM

## 2021-09-17 DIAGNOSIS — G43.109 MIGRAINE WITH AURA AND WITHOUT STATUS MIGRAINOSUS, NOT INTRACTABLE: ICD-10-CM

## 2021-09-17 DIAGNOSIS — L29.9 ITCHING OF EAR: ICD-10-CM

## 2021-09-17 DIAGNOSIS — M46.1 BILATERAL SACROILIITIS (HCC): ICD-10-CM

## 2021-09-17 PROCEDURE — 99214 OFFICE O/P EST MOD 30 MIN: CPT | Performed by: FAMILY MEDICINE

## 2021-09-17 RX ORDER — TOPIRAMATE 25 MG/1
25 TABLET ORAL DAILY
Qty: 90 TABLET | Refills: 1 | Status: SHIPPED | OUTPATIENT
Start: 2021-09-17 | End: 2021-11-05 | Stop reason: SDUPTHER

## 2021-09-17 RX ORDER — METHOCARBAMOL 750 MG/1
750 TABLET, FILM COATED ORAL 4 TIMES DAILY
Qty: 56 TABLET | Refills: 0 | Status: SHIPPED | OUTPATIENT
Start: 2021-09-17 | End: 2021-10-01

## 2021-09-17 ASSESSMENT — FIBROSIS 4 INDEX: FIB4 SCORE: 0.45

## 2021-09-17 NOTE — ASSESSMENT & PLAN NOTE
Chronic issue  Has had b/l ear canal itching over the 6 months  Does have seasonal allergies  Has tried OTC allergy meds w/o help

## 2021-09-17 NOTE — ASSESSMENT & PLAN NOTE
Chronic issue   Has hx of recurrent migraines   Has tried imitrex which didn't help much  Has ongoing headaches on a daily basis  Endorses nausea  Also has auras  Has tried OTC Excedrin which helps

## 2021-09-17 NOTE — PROGRESS NOTES
"Subjective:     Chief Complaint   Patient presents with   • Establish Care       HPI:   Margot presents today to discuss the following.     Itching of ear  Chronic issue  Has had b/l ear canal itching over the 6 months  Does have seasonal allergies  Has tried OTC allergy meds w/o help      Migraine with aura and without status migrainosus, not intractable  Chronic issue   Has hx of recurrent migraines   Has tried imitrex which didn't help much  Has ongoing headaches on a daily basis  Endorses nausea  Also has auras  Has tried OTC Excedrin which helps    Bilateral sacroiliitis (HCC)  Chronic issue  Has had sacral pain with sciatica radiation R>L  Prolonged standing makes it worse  No trauma  Has not tried formal PT      Past Medical History:   Diagnosis Date   • ASTHMA     since childhood; RAD vs allergies   • History of gestational diabetes    • Other hyperlipidemia 7/7/2019       Current Outpatient Medications Ordered in Epic   Medication Sig Dispense Refill   • topiramate (TOPAMAX) 25 MG Tab Take 1 Tablet by mouth every day for 90 days. 90 Tablet 1   • methocarbamol (ROBAXIN) 750 MG Tab Take 1 Tablet by mouth 4 times a day for 14 days. 56 Tablet 0     No current Epic-ordered facility-administered medications on file.       Allergies:  Bloodless    Health Maintenance: Completed    ROS:  Gen: no fevers/chills, no changes in weight  Eyes: no changes in vision  Pulm: no sob, no cough  CV: no chest pain, no palpitations  GI: no nausea/vomiting, no diarrhea      Objective:     Exam:  /82 (BP Location: Left arm, Patient Position: Sitting, BP Cuff Size: Adult)   Pulse 79   Temp 36.8 °C (98.2 °F) (Temporal)   Ht 1.575 m (5' 2\")   Wt 69 kg (152 lb 3.2 oz)   SpO2 98%   BMI 27.84 kg/m²  Body mass index is 27.84 kg/m².    Gen: Alert and oriented, No apparent distress.  HENT: TMs appear ruptured bilaterally with dried blood. Oropharynx is w/o erythema or exudates. Neck is supple without cervical lymphadenopathy. " No JVD.   Eyes: PERRLA  Lungs: Normal effort, CTA bilaterally, no wheezes, rhonchi, or rales  CV: Regular rate and rhythm. No murmurs, rubs, or gallops.  Abdomen: Soft, nontender, nondistended. Normal bowel sounds. Liver and spleen are not palpable  Ext: No clubbing, cyanosis, edema.  Skin: no rash, lesions or ulcers  Neuro: Moves all extremities.  Psych: AAOx3      Assessment & Plan:     36 y.o. female with the following -     1. Itching of ear  2. Ruptured tympanic membrane, bilateral  Chronic, unchanged condition.  Continues to experience itching ears and unfortunately has been using Q-tips.  On physical exam she appears to have bilateral ruptured tympanic membranes.  I discouraged the use of Q-tips at this time.  She may apply ointment for external canal itching relief.  I will refer to ENT for further tympanic membrane evaluation.  - REFERRAL TO ENT    2. Migraine with aura and without status migrainosus, not intractable  Chronic, unchanged condition.  Has ongoing migraines on a daily basis.  Imitrex did not help very much.  We will do a trial of Topamax today.  We will reassess in 6 weeks.  - topiramate (TOPAMAX) 25 MG Tab; Take 1 Tablet by mouth every day for 90 days.  Dispense: 90 Tablet; Refill: 1    4. Bilateral sacroiliitis (HCC)  Chronic, stable condition.  The patient has a history of sacroiliitis.  Denies acute flares today.  However she would like to have Robaxin on hand for occasional flareups.  - methocarbamol (ROBAXIN) 750 MG Tab; Take 1 Tablet by mouth 4 times a day for 14 days.  Dispense: 56 Tablet; Refill: 0      Return in about 6 weeks (around 10/29/2021).    Please note that this dictation was created using voice recognition software. I have made every reasonable attempt to correct obvious errors, but I expect that there are errors of grammar and possibly content that I did not discover before finalizing the note.

## 2021-09-17 NOTE — ASSESSMENT & PLAN NOTE
Chronic issue  Has had sacral pain with sciatica radiation R>L  Prolonged standing makes it worse  No trauma  Has not tried formal PT

## 2021-10-11 ENCOUNTER — TELEPHONE (OUTPATIENT)
Dept: MEDICAL GROUP | Facility: PHYSICIAN GROUP | Age: 36
End: 2021-10-11

## 2021-10-11 NOTE — TELEPHONE ENCOUNTER
VOICEMAIL  1. Caller Name: Margot Chowdary    Call Back Number: [phone]      2. Message: Patient left VM stating she called ENT to set appointment and they told her They have not yet received a Referral even though she already receive confirmation through referral dept.    3. Patient approves office to leave a detailed voicemail/MyChart message: N\A

## 2021-10-22 ENCOUNTER — TELEPHONE (OUTPATIENT)
Dept: MEDICAL GROUP | Facility: PHYSICIAN GROUP | Age: 36
End: 2021-10-22

## 2021-10-23 NOTE — TELEPHONE ENCOUNTER
Phone Number Called: [phone]      Call outcome: Spoke to patient regarding message below.    Message: Patient called wondering if she is eligible to receive Covid Booster if she because she has Asthma.    Please Advise.

## 2021-10-26 NOTE — TELEPHONE ENCOUNTER
Phone Number Called: [phone] (home)     Call outcome: Spoke to patient regarding message below.    Message: understood message no questions

## 2021-11-05 ENCOUNTER — OFFICE VISIT (OUTPATIENT)
Dept: MEDICAL GROUP | Facility: PHYSICIAN GROUP | Age: 36
End: 2021-11-05
Payer: COMMERCIAL

## 2021-11-05 VITALS
TEMPERATURE: 97.3 F | DIASTOLIC BLOOD PRESSURE: 68 MMHG | SYSTOLIC BLOOD PRESSURE: 100 MMHG | HEIGHT: 62 IN | OXYGEN SATURATION: 96 % | WEIGHT: 148.6 LBS | HEART RATE: 85 BPM | BODY MASS INDEX: 27.34 KG/M2

## 2021-11-05 DIAGNOSIS — J30.2 SEASONAL ALLERGIES: ICD-10-CM

## 2021-11-05 DIAGNOSIS — G43.109 MIGRAINE WITH AURA AND WITHOUT STATUS MIGRAINOSUS, NOT INTRACTABLE: ICD-10-CM

## 2021-11-05 PROCEDURE — 99214 OFFICE O/P EST MOD 30 MIN: CPT | Performed by: FAMILY MEDICINE

## 2021-11-05 RX ORDER — AZELASTINE 1 MG/ML
1 SPRAY, METERED NASAL 2 TIMES DAILY
Qty: 30 ML | Refills: 3 | Status: SHIPPED | OUTPATIENT
Start: 2021-11-05 | End: 2021-11-29

## 2021-11-05 RX ORDER — FLUTICASONE PROPIONATE 50 MCG
1 SPRAY, SUSPENSION (ML) NASAL 2 TIMES DAILY
Qty: 16 G | Refills: 3 | Status: SHIPPED | OUTPATIENT
Start: 2021-11-05 | End: 2021-11-29

## 2021-11-05 RX ORDER — TOPIRAMATE 50 MG/1
50 TABLET, FILM COATED ORAL DAILY
Qty: 90 TABLET | Refills: 1 | Status: SHIPPED | OUTPATIENT
Start: 2021-11-05 | End: 2022-05-09

## 2021-11-05 ASSESSMENT — FIBROSIS 4 INDEX: FIB4 SCORE: 0.45

## 2021-11-05 ASSESSMENT — PATIENT HEALTH QUESTIONNAIRE - PHQ9: CLINICAL INTERPRETATION OF PHQ2 SCORE: 0

## 2021-11-05 NOTE — ASSESSMENT & PLAN NOTE
Chronic issue  Now returns for FU   Last evaluated 6 wks ago  Started her on topamax 25m daily which has greatly worked  She feels satisfied but would like to go up on dose

## 2021-11-05 NOTE — ASSESSMENT & PLAN NOTE
Chronic issue  Worsening  Continues to have symptoms this season  Has tried multiple OTC meds but persist  Needs refill for flonase  Has followed up with allergist before

## 2021-11-05 NOTE — PROGRESS NOTES
"Subjective:     Chief Complaint   Patient presents with   • Medication Management     Dizzines at first, Better,    • Migraine     3 days straight   • Seasonal Allergies     over the counter medication not working.       HPI:   Margot presents today to discuss the following.    Migraine with aura and without status migrainosus, not intractable  Chronic issue  Now returns for FU   Last evaluated 6 wks ago  Started her on topamax 25m daily which has greatly worked  She feels satisfied but would like to go up on dose    Seasonal allergies  Chronic issue  Worsening  Continues to have symptoms this season  Has tried multiple OTC meds but persist  Needs refill for flonase  Has followed up with allergist before       Past Medical History:   Diagnosis Date   • ASTHMA     since childhood; RAD vs allergies   • History of gestational diabetes    • Other hyperlipidemia 7/7/2019       Current Outpatient Medications Ordered in Epic   Medication Sig Dispense Refill   • topiramate (TOPAMAX) 50 MG tablet Take 1 Tablet by mouth every day for 90 days. 90 Tablet 1   • fluticasone (FLONASE) 50 MCG/ACT nasal spray Administer 1 Spray into affected nostril(S) 2 times a day. 16 g 3   • azelastine (ASTELIN) 137 MCG/SPRAY nasal spray Administer 1 Spray into affected nostril(S) 2 times a day. 30 mL 3     No current [facility]-ordered facility-administered medications on file.       Allergies:  Bloodless    Health Maintenance: Completed    ROS:  Gen: no fevers/chills, no changes in weight  Eyes: no changes in vision  Pulm: no sob, no cough  CV: no chest pain, no palpitations  GI: no nausea/vomiting, no diarrhea      Objective:     Exam:  /68 (BP Location: Left arm, Patient Position: Sitting, BP Cuff Size: Adult)   Pulse 85   Temp 36.3 °C (97.3 °F) (Temporal)   Ht 1.575 m (5' 2\")   Wt 67.4 kg (148 lb 9.6 oz)   SpO2 96%   BMI 27.18 kg/m²  Body mass index is 27.18 kg/m².      Constitutional: Alert, no distress, well-groomed.  Skin: Warm, " dry, good turgor, no rashes in visible areas.  Eye: Equal, round and reactive, conjunctiva clear, lids normal.  ENMT: Lips without lesions, good dentition, moist mucous membranes.  Neck: Trachea midline, no masses, no thyromegaly.  Respiratory: Unlabored respiratory effort, no cough.  MSK: Normal gait, moves all extremities.  Neuro: Grossly non-focal.   Psych: Alert and oriented x3, normal affect and mood.        Assessment & Plan:     36 y.o. female with the following -     1. Migraine with aura and without status migrainosus, not intractable  Chronic, stable condition.  The patient presents today for follow-up.  She has been responding very well to Topamax 25 mg daily.  She feels ready to go up to 50 mg daily.  - topiramate (TOPAMAX) 50 MG tablet; Take 1 Tablet by mouth every day for 90 days.  Dispense: 90 Tablet; Refill: 1    2. Seasonal allergies  Chronic, worsening condition.  Seasonal allergies have gotten worse.  Rather than following up with an allergist she would like to do a trial of Flonase plus azelastine.  We will reassess in 3 months.  - fluticasone (FLONASE) 50 MCG/ACT nasal spray; Administer 1 Spray into affected nostril(S) 2 times a day.  Dispense: 16 g; Refill: 3  - azelastine (ASTELIN) 137 MCG/SPRAY nasal spray; Administer 1 Spray into affected nostril(S) 2 times a day.  Dispense: 30 mL; Refill: 3      Return in about 3 months (around 2/5/2022).    Please note that this dictation was created using voice recognition software. I have made every reasonable attempt to correct obvious errors, but I expect that there are errors of grammar and possibly content that I did not discover before finalizing the note.

## 2021-11-18 ENCOUNTER — TELEPHONE (OUTPATIENT)
Dept: MEDICAL GROUP | Facility: PHYSICIAN GROUP | Age: 36
End: 2021-11-18

## 2021-11-18 NOTE — TELEPHONE ENCOUNTER
Phone Number Called: [phone]     Call outcome: Did not leave a detailed message. Requested patient to call back.    Message: LVM to patient we are able to refill her request until an appointment is made, Also let patient know if shortness of breath continues please seek Urgent Care per provider request.

## 2021-11-18 NOTE — TELEPHONE ENCOUNTER
VOICEMAIL  1. Caller Name: Margot Chowdary                         Call Back Number: [phone]    2. Message: Patient called stating she needs her inhaler for asthma. Shes been having shortness of breathe    Please advise.    3. Patient approves office to leave a detailed voicemail/MyChart message: N\A

## 2022-01-20 ENCOUNTER — TELEPHONE (OUTPATIENT)
Dept: MEDICAL GROUP | Facility: PHYSICIAN GROUP | Age: 37
End: 2022-01-20

## 2022-01-20 DIAGNOSIS — Z20.822 EXPOSURE TO COVID-19 VIRUS: ICD-10-CM

## 2022-01-20 NOTE — TELEPHONE ENCOUNTER
VOICEMAIL  1. Caller Name: Margot Chowdary                      Call Back Number: [phone]    2. Message: Patient LVM stating she is extremely Sick, Patient states all Pharmacies are sold out and needs to be tested for COVID.    Please advise    3. Patient approves office to leave a detailed voicemail/MyChart message: N\A

## 2022-01-21 NOTE — TELEPHONE ENCOUNTER
Phone Number Called: [phone]      Call outcome: Spoke to patient regarding message below.    Message: Spoke to patient informing her of her orders however patient stated she was able to find a testing facility and got tested. Patient states she tested positive and has been isolating since. Patient would like to know what medication she should take in order to get better in the mean time.    Please advise

## 2022-01-21 NOTE — TELEPHONE ENCOUNTER
Phone Number Called: [phone]      Call outcome: Did not leave a detailed message. Requested patient to call back.    Message: LVM to please call back.

## 2022-02-09 ENCOUNTER — TELEPHONE (OUTPATIENT)
Dept: MEDICAL GROUP | Facility: PHYSICIAN GROUP | Age: 37
End: 2022-02-09

## 2022-02-11 ENCOUNTER — OFFICE VISIT (OUTPATIENT)
Dept: MEDICAL GROUP | Facility: PHYSICIAN GROUP | Age: 37
End: 2022-02-11
Payer: COMMERCIAL

## 2022-02-11 VITALS
WEIGHT: 151.6 LBS | HEART RATE: 89 BPM | OXYGEN SATURATION: 94 % | SYSTOLIC BLOOD PRESSURE: 112 MMHG | BODY MASS INDEX: 27.9 KG/M2 | TEMPERATURE: 98.2 F | DIASTOLIC BLOOD PRESSURE: 70 MMHG | HEIGHT: 62 IN

## 2022-02-11 DIAGNOSIS — G43.109 MIGRAINE WITH AURA AND WITHOUT STATUS MIGRAINOSUS, NOT INTRACTABLE: ICD-10-CM

## 2022-02-11 DIAGNOSIS — J45.30 MILD PERSISTENT ASTHMA, UNSPECIFIED WHETHER COMPLICATED: ICD-10-CM

## 2022-02-11 PROCEDURE — 99213 OFFICE O/P EST LOW 20 MIN: CPT | Performed by: FAMILY MEDICINE

## 2022-02-11 RX ORDER — ALBUTEROL SULFATE 90 UG/1
2 AEROSOL, METERED RESPIRATORY (INHALATION) EVERY 4 HOURS PRN
Qty: 1 EACH | Refills: 3 | Status: SHIPPED | OUTPATIENT
Start: 2022-02-11

## 2022-02-11 RX ORDER — TOPIRAMATE SPINKLE 25 MG/1
25 CAPSULE ORAL DAILY
Qty: 90 CAPSULE | Refills: 3 | Status: SHIPPED | OUTPATIENT
Start: 2022-02-11 | End: 2022-10-11

## 2022-02-11 RX ORDER — SUMATRIPTAN 50 MG/1
50 TABLET, FILM COATED ORAL
Qty: 10 TABLET | Refills: 3 | Status: SHIPPED | OUTPATIENT
Start: 2022-02-11 | End: 2022-06-23

## 2022-02-11 RX ORDER — TOPIRAMATE SPINKLE 25 MG/1
25 CAPSULE ORAL DAILY
Qty: 290 CAPSULE | Refills: 3 | Status: SHIPPED | OUTPATIENT
Start: 2022-02-11 | End: 2022-02-11

## 2022-02-11 ASSESSMENT — ENCOUNTER SYMPTOMS
FEVER: 0
PALPITATIONS: 0
HEADACHES: 1
COUGH: 0
SHORTNESS OF BREATH: 0
CHILLS: 0
BLURRED VISION: 0
VOMITING: 0
SORE THROAT: 0
NAUSEA: 1
DOUBLE VISION: 0
MYALGIAS: 0
DIZZINESS: 0

## 2022-02-11 ASSESSMENT — PATIENT HEALTH QUESTIONNAIRE - PHQ9: CLINICAL INTERPRETATION OF PHQ2 SCORE: 0

## 2022-02-12 NOTE — PROGRESS NOTES
"Subjective:     CC: Headaches    HPI:   Margot presents today with spouse for eval of     1) x4 days of headache  Previous on topamax and sumatriptan?   Red flag, reports headache will wake her from sleep  Nausea no vomiting  Pulsing behind R eye  No change in tita field of vision    Asthma med refill     Problem   Mild Persistent Asthma   Migraine With Aura and Without Status Migrainosus, Not Intractable       Current Outpatient Medications Ordered in Epic   Medication Sig Dispense Refill   • albuterol 108 (90 Base) MCG/ACT Aero Soln inhalation aerosol Inhale 2 Puffs every four hours as needed for Shortness of Breath. 1 Each 3   • SUMAtriptan (IMITREX) 50 MG Tab Take 1 Tablet by mouth one time as needed for Migraine for up to 1 dose. 10 Tablet 3   • topiramate (TOPAMAX) 25 MG capsule Take 1 Capsule by mouth every day. 90 Capsule 3   • azelastine (ASTELIN) 137 MCG/SPRAY nasal spray ADMINISTER 1 SPRAY INTO AFFECTED NOSTRIL(S) 2 TIMES A DAY. 30 g 3   • fluticasone (FLONASE) 50 MCG/ACT nasal spray ADMINISTER 1 SPRAY INTO AFFECTED NOSTRIL(S) 2 TIMES A DAY. 16 mL 3     No current [facility]-ordered facility-administered medications on file.     ROS:  Review of Systems   Constitutional: Negative for chills and fever.   HENT: Negative for ear pain and sore throat.    Eyes: Negative for blurred vision and double vision.   Respiratory: Negative for cough and shortness of breath.    Cardiovascular: Negative for chest pain and palpitations.   Gastrointestinal: Positive for nausea. Negative for vomiting.   Genitourinary: Negative for dysuria and hematuria.   Musculoskeletal: Negative for myalgias.   Neurological: Positive for headaches. Negative for dizziness.       Objective:     Exam:  /70 (BP Location: Left arm, Patient Position: Sitting, BP Cuff Size: Adult)   Pulse 89   Temp 36.8 °C (98.2 °F) (Temporal)   Ht 1.575 m (5' 2\")   Wt 68.8 kg (151 lb 9.6 oz)   SpO2 94%   BMI 27.73 kg/m²  Body mass index is 27.73 " kg/m².    Physical Exam  Constitutional:       General: She is not in acute distress.     Appearance: Normal appearance. She is not ill-appearing, toxic-appearing or diaphoretic.   Eyes:      General: No scleral icterus.        Right eye: No discharge.         Left eye: No discharge.      Extraocular Movements: Extraocular movements intact.      Conjunctiva/sclera: Conjunctivae normal.      Pupils: Pupils are equal, round, and reactive to light.   Neurological:      General: No focal deficit present.      Mental Status: She is alert.      Coordination: Coordination normal.      Gait: Gait normal.         Assessment & Plan:     36 y.o. female with the following -     Problem List Items Addressed This Visit     Migraine with aura and without status migrainosus, not intractable     Chronic, flare  Restarted topamax daily for 2-3 wks  One week 25mg, then to 50mg daily  If not better then RTC    Sumatriptan prn discussed ADRs    Keep HA diary  Will discuss Neuro ref or CT if redflags  Red flags ER for eval         Relevant Medications    SUMAtriptan (IMITREX) 50 MG Tab    topiramate (TOPAMAX) 25 MG capsule    Mild persistent asthma     Step 1  Move to step two if not resolved  Pt aware of steps and to look for triggers         Relevant Medications    albuterol 108 (90 Base) MCG/ACT Aero Soln inhalation aerosol        Return in about 3 weeks (around 3/4/2022), or if symptoms worsen or fail to improve, for return for further eval if not resolved.    Please note that this dictation was created using voice recognition software. I have made every reasonable attempt to correct obvious errors, but I expect that there are errors of grammar and possibly content that I did not discover before finalizing the note.

## 2022-05-08 DIAGNOSIS — G43.109 MIGRAINE WITH AURA AND WITHOUT STATUS MIGRAINOSUS, NOT INTRACTABLE: ICD-10-CM

## 2022-05-09 RX ORDER — TOPIRAMATE 50 MG/1
TABLET, FILM COATED ORAL
Qty: 90 TABLET | Refills: 1 | Status: SHIPPED | OUTPATIENT
Start: 2022-05-09 | End: 2022-10-11 | Stop reason: SDUPTHER

## 2022-06-23 DIAGNOSIS — G43.109 MIGRAINE WITH AURA AND WITHOUT STATUS MIGRAINOSUS, NOT INTRACTABLE: ICD-10-CM

## 2022-06-23 RX ORDER — SUMATRIPTAN 50 MG/1
50 TABLET, FILM COATED ORAL
Qty: 10 TABLET | Refills: 3 | Status: SHIPPED | OUTPATIENT
Start: 2022-06-23 | End: 2022-10-11 | Stop reason: SDUPTHER

## 2022-07-07 DIAGNOSIS — J30.2 SEASONAL ALLERGIES: ICD-10-CM

## 2022-07-07 RX ORDER — FLUTICASONE PROPIONATE 50 MCG
1 SPRAY, SUSPENSION (ML) NASAL 2 TIMES DAILY
Qty: 48 ML | Refills: 1 | Status: SHIPPED | OUTPATIENT
Start: 2022-07-07

## 2022-10-07 DIAGNOSIS — G43.109 MIGRAINE WITH AURA AND WITHOUT STATUS MIGRAINOSUS, NOT INTRACTABLE: ICD-10-CM

## 2022-10-11 DIAGNOSIS — G43.109 MIGRAINE WITH AURA AND WITHOUT STATUS MIGRAINOSUS, NOT INTRACTABLE: ICD-10-CM

## 2022-10-11 RX ORDER — TOPIRAMATE 50 MG/1
50 TABLET, FILM COATED ORAL
Qty: 90 TABLET | Refills: 1 | Status: SHIPPED | OUTPATIENT
Start: 2022-10-11

## 2022-10-11 RX ORDER — TOPIRAMATE SPINKLE 25 MG/1
25 CAPSULE ORAL DAILY
Qty: 90 CAPSULE | Refills: 3 | OUTPATIENT
Start: 2022-10-11

## 2022-10-11 RX ORDER — SUMATRIPTAN 50 MG/1
50 TABLET, FILM COATED ORAL
Qty: 10 TABLET | Refills: 3 | Status: SHIPPED | OUTPATIENT
Start: 2022-10-11 | End: 2022-11-17

## 2022-10-11 NOTE — TELEPHONE ENCOUNTER
Patient came into office stating she has called [facility] for PCP multiple times attempting to reach out to PCP requesting to change Capsules to tablets. Please advise, She states that the capsules are very difficult for her to swallow maybe a small tablet if possible.       Pt is requesting that we please call her when this is changed and sent to pharmacy

## 2022-12-06 NOTE — PROGRESS NOTES
1. \"Have you been to the ER, urgent care clinic since your last visit? Hospitalized since your last visit? \" No    2. \"Have you seen or consulted any other health care providers outside of the [address] [place] [address] since your last visit? \" No     3. For patients aged 39-70: Has the patient had a colonoscopy / FIT/ Cologuard? No     If the patient is female:    4. For patients aged 41-77: Has the patient had a mammogram within the past 2 years? Yes - no Care Gap present    5. For patients aged 21-65: Has the patient had a pap smear?  No Chief Complaint   Patient presents with   • URI     uri , symptoms , vomitting x 2 days .             Cough  This is a new problem. The current episode started 2 days ago. The problem has been unchanged. The problem occurs constantly. The cough is dry. Associated symptoms include : fatigue, muscle aches, headache, fever, n/v. Pertinent negatives include no   diarrhea, sweats, weight loss or wheezing. Nothing aggravates the symptoms.  Patient has tried nothing for the symptoms. There is no history of asthma.        Past Medical History   Diagnosis Date   • ASTHMA          Social History   Substance Use Topics   • Smoking status: Never Smoker    • Smokeless tobacco: Never Used   • Alcohol Use: No         Current Outpatient Prescriptions on File Prior to Visit   Medication Sig Dispense Refill   • albuterol (PROVENTIL) 2.5mg/3ml Nebu Soln solution for nebulization 3 mL by Nebulization route every four hours as needed for Shortness of Breath. 75 mL 0   • loratadine (CLARITIN) 10 MG Tab Take 10 mg by mouth every day.     • Dextromethorphan-Guaifenesin (CHEST CONGESTION/COUGH RELIEF PO) Take  by mouth.     • albuterol 108 (90 BASE) MCG/ACT Aero Soln inhalation aerosol Inhale 2 Puffs by mouth every 6 hours as needed for Shortness of Breath. 8.5 g 3   • naproxen (NAPROSYN) 500 MG Tab Take 1 Tab by mouth 2 times a day, with meals. 60 Tab 3   • Pseudoephedrine-Guaifenesin (MUCINEX D) 120-1200 MG TABLET SR 12 HR Take 1 Dose by mouth 2 Times a Day. 28 Tab 0   • albuterol (VENTOLIN OR PROVENTIL) 108 (90 BASE) MCG/ACT Aero Soln inhalation aerosol Inhale 2 Puffs by mouth every four hours as needed. 1 Inhaler 0   • cetirizine (ZYRTEC) 10 MG Tab Take 1 Tab by mouth every day. 14 Tab 0   • albuterol (PROVENTIL) 2.5mg/3ml Nebu Soln solution for nebulization 3 mL by Nebulization route every four hours as needed for Shortness of Breath. (Patient not taking: Reported on 9/14/2016) 75 mL 0   • ibuprofen (MOTRIN) 600 MG TABS Take 1 Tab by  "mouth every 6 hours as needed (Cramping). (Patient not taking: Reported on 9/14/2016) 30 Tab 1     No current facility-administered medications on file prior to visit.                    Review of Systems   Constitutional: Negative for fever and weight loss.   HENT: negative for otalgia  Cardiovascular - denies chest pain or dyspnea  Respiratory: Positive for cough.  .  Negative for wheezing.    Neurological: Negative for headaches.   GI - denies nausea, vomiting or diarrhea  Neuro - denies numbness or tingling.            Objective:     Blood pressure 110/64, pulse 120, temperature 38.1 °C (100.5 °F), resp. rate 14, height 1.575 m (5' 2\"), weight 63.504 kg (140 lb), last menstrual period 12/26/2016, SpO2 96 %, not currently breastfeeding.    Physical Exam   Constitutional: patient is oriented to person, place, and time. Patient appears well-developed and well-nourished. No distress.   HENT:   Head: Normocephalic and atraumatic.   Right Ear: External ear normal.   Left Ear: External ear normal.   Nose: Mucosal edema  present. Right sinus exhibits no maxillary sinus tenderness. Left sinus exhibits no maxillary sinus tenderness.   Mouth/Throat: Mucous membranes are normal. No oral lesions.  No posterior pharyngeal erythema.  No oropharyngeal exudate or posterior oropharyngeal edema.   Eyes: Conjunctivae and EOM are normal. Pupils are equal, round, and reactive to light. Right eye exhibits no discharge. Left eye exhibits no discharge. No scleral icterus.   Neck: Normal range of motion. Neck supple. No tracheal deviation present.   Cardiovascular: Normal rate, regular rhythm and normal heart sounds.  Exam reveals no friction rub.    Pulmonary/Chest: Effort normal. No respiratory distress. Patient has no wheezes or rhonchi. Patient has no rales.    Musculoskeletal:  exhibits no edema.   Lymphadenopathy:     Patient has no cervical adenopathy.      Neurological: patient is alert and oriented to person, place, and time. "   Skin: Skin is warm and dry. No rash noted. No erythema.   Psychiatric: patient  has a normal mood and affect.  behavior is normal.   Nursing note and vitals reviewed.              Assessment/Plan:          1. Influenza A  Positive for influenza A    - oseltamivir (TAMIFLU) 75 MG Cap; Take 1 Cap by mouth 2 times a day for 5 days.  Dispense: 10 Cap; Refill: 0    Nausea improved after zofran.   Passed PO challenge.        2. Headache, unspecified headache type     - ketorolac (TORADOL) injection 30 mg; 1 mL by Intramuscular route Once.

## 2023-03-02 NOTE — ASSESSMENT & PLAN NOTE
Chronic, flare  Restarted topamax daily for 2-3 wks  One week 25mg, then to 50mg daily  If not better then RTC    Sumatriptan prn discussed ADRs    Keep HA diary  Will discuss Neuro ref or CT if redflags  Red flags ER for eval  
Step 1  Move to step two if not resolved  Pt aware of steps and to look for triggers  
No

## 2023-04-20 ENCOUNTER — OFFICE VISIT (OUTPATIENT)
Dept: MEDICAL GROUP | Facility: PHYSICIAN GROUP | Age: 38
End: 2023-04-20
Payer: COMMERCIAL

## 2023-04-20 VITALS
HEIGHT: 62 IN | HEART RATE: 76 BPM | DIASTOLIC BLOOD PRESSURE: 88 MMHG | SYSTOLIC BLOOD PRESSURE: 124 MMHG | RESPIRATION RATE: 16 BRPM | WEIGHT: 152.8 LBS | BODY MASS INDEX: 28.12 KG/M2 | OXYGEN SATURATION: 96 % | TEMPERATURE: 98.4 F

## 2023-04-20 DIAGNOSIS — J30.9 ALLERGIC RHINITIS, UNSPECIFIED SEASONALITY, UNSPECIFIED TRIGGER: ICD-10-CM

## 2023-04-20 DIAGNOSIS — Z00.00 WELL WOMAN EXAM (NO GYNECOLOGICAL EXAM): ICD-10-CM

## 2023-04-20 DIAGNOSIS — H02.824 CYST OF LEFT UPPER EYELID: ICD-10-CM

## 2023-04-20 PROCEDURE — 99214 OFFICE O/P EST MOD 30 MIN: CPT | Performed by: FAMILY MEDICINE

## 2023-04-20 RX ORDER — ERYTHROMYCIN 5 MG/G
1 OINTMENT OPHTHALMIC
Qty: 3.5 G | Refills: 0 | Status: SHIPPED | OUTPATIENT
Start: 2023-04-20 | End: 2023-08-14

## 2023-04-20 ASSESSMENT — PATIENT HEALTH QUESTIONNAIRE - PHQ9: CLINICAL INTERPRETATION OF PHQ2 SCORE: 0

## 2023-04-20 NOTE — PROGRESS NOTES
"Subjective:     Chief Complaint   Patient presents with    Eye Problem     L, itch, pain redness        HPI:   Margot presents today to discuss the following.    Cyst of left upper eyelid  Chronic issue  The patient has had persistent left upper eyelid lump right at the lateral crease for a couple of years  It becomes irritated and swollen  Sometimes her eyes become red    Allergic rhinitis  Chronic issue  uncontrolled    Needs new referral     Past Medical History:   Diagnosis Date    ASTHMA     since childhood; RAD vs allergies    History of gestational diabetes     Other hyperlipidemia 7/7/2019       Current Outpatient Medications Ordered in Epic   Medication Sig Dispense Refill    erythromycin 5 MG/GM Ointment Apply 1 Application. to both eyes at bedtime. 3.5 g 0    SUMAtriptan (IMITREX) 50 MG Tab TAKE 1 TABLET BY MOUTH ONE TIME AS NEEDED FOR MIGRAINE FOR UP TO 1 DOSE.-INS MAX 9/30 9 Tablet 4    topiramate (TOPAMAX) 50 MG tablet Take 1 Tablet by mouth every day. 90 Tablet 1    fluticasone (FLONASE) 50 MCG/ACT nasal spray ADMINISTER 1 SPRAY INTO AFFECTED NOSTRIL(S) 2 TIMES A DAY. 48 mL 1    albuterol 108 (90 Base) MCG/ACT Aero Soln inhalation aerosol Inhale 2 Puffs every four hours as needed for Shortness of Breath. 1 Each 3    azelastine (ASTELIN) 137 MCG/SPRAY nasal spray ADMINISTER 1 SPRAY INTO AFFECTED NOSTRIL(S) 2 TIMES A DAY. 30 g 3     No current [facility]-ordered facility-administered medications on file.       Allergies:  Bloodless    Health Maintenance: Completed    ROS:  Gen: no fevers/chills, no changes in weight  Eyes: no changes in vision  Pulm: no sob, no cough  CV: no chest pain, no palpitations  GI: no nausea/vomiting, no diarrhea      Objective:     Exam:  /88 (BP Location: Left arm, Patient Position: Sitting)   Pulse 76   Temp 36.9 °C (98.4 °F) (Temporal)   Resp 16   Ht 1.575 m (5' 2\")   Wt 69.3 kg (152 lb 12.8 oz)   SpO2 96%   BMI 27.95 kg/m²  Body mass index is 27.95 " kg/m².    Gen: Alert and oriented, No apparent distress.  Eyes: PERRLA.  Evaluation of the left upper eyelid demonstrates a small mass right at the crease measuring approximately 2 mm in diameter.  Lungs: Normal effort, CTA bilaterally, no wheezes, rhonchi, or rales  CV: Regular rate and rhythm. No murmurs, rubs, or gallops.  Ext: No clubbing, cyanosis, edema.  Skin: no rash, lesions or ulcers  Neuro: Moves all extremities.  Psych: AAOx3        Assessment & Plan:     38 y.o. female with the following -     1. Cyst of left upper eyelid  Chronic condition.  Uncontrolled.  Unknown etiology.  May be a blocked cyst versus blocked tear duct.  I will refer to ophthalmology.  I will prescribe erythromycin ointment.  She may also do hot compresses.  - Referral to Ophthalmology  - erythromycin 5 MG/GM Ointment; Apply 1 Application. to both eyes at bedtime.  Dispense: 3.5 g; Refill: 0    2. Allergic rhinitis, unspecified seasonality, unspecified trigger  Chronic, uncontrolled condition.  The patient continues to experience allergies.  She would like to establish with a new allergist.  - Referral to Allergy    3. Well woman exam (no gynecological exam)\  - CBC WITHOUT DIFFERENTIAL; Future  - Comp Metabolic Panel; Future  - Lipid Profile; Future  - HEMOGLOBIN A1C; Future      Return in about 3 months (around 7/20/2023) for PHYSICAL.          Please note that this dictation was created using voice recognition software. I have made every reasonable attempt to correct obvious errors, but I expect that there are errors of grammar and possibly content that I did not discover before finalizing the note.

## 2023-04-20 NOTE — ASSESSMENT & PLAN NOTE
Chronic issue  The patient has had persistent left upper eyelid lump right at the lateral crease for a couple of years  It becomes irritated and swollen  Sometimes her eyes become red

## 2023-06-23 ENCOUNTER — OFFICE VISIT (OUTPATIENT)
Dept: MEDICAL GROUP | Facility: PHYSICIAN GROUP | Age: 38
End: 2023-06-23
Payer: COMMERCIAL

## 2023-06-23 VITALS
OXYGEN SATURATION: 97 % | SYSTOLIC BLOOD PRESSURE: 120 MMHG | TEMPERATURE: 99.1 F | HEART RATE: 74 BPM | DIASTOLIC BLOOD PRESSURE: 62 MMHG

## 2023-06-23 DIAGNOSIS — M54.42 ACUTE LEFT-SIDED LOW BACK PAIN WITH LEFT-SIDED SCIATICA: ICD-10-CM

## 2023-06-23 PROCEDURE — 3074F SYST BP LT 130 MM HG: CPT | Performed by: FAMILY MEDICINE

## 2023-06-23 PROCEDURE — 99214 OFFICE O/P EST MOD 30 MIN: CPT | Performed by: FAMILY MEDICINE

## 2023-06-23 PROCEDURE — 3078F DIAST BP <80 MM HG: CPT | Performed by: FAMILY MEDICINE

## 2023-06-23 RX ORDER — CYCLOBENZAPRINE HCL 10 MG
10 TABLET ORAL 3 TIMES DAILY PRN
Qty: 30 TABLET | Refills: 1 | Status: SHIPPED | OUTPATIENT
Start: 2023-06-23

## 2023-06-23 RX ORDER — METHOCARBAMOL 750 MG/1
750 TABLET, FILM COATED ORAL 4 TIMES DAILY
Qty: 56 TABLET | Refills: 0 | Status: SHIPPED | OUTPATIENT
Start: 2023-06-23 | End: 2023-06-23

## 2023-06-23 RX ORDER — MELOXICAM 15 MG/1
15 TABLET ORAL DAILY
Qty: 30 TABLET | Refills: 1 | Status: SHIPPED | OUTPATIENT
Start: 2023-06-23 | End: 2023-08-28

## 2023-06-23 NOTE — PROGRESS NOTES
Subjective:     Chief Complaint   Patient presents with    Nerve Pain     Right side sciatica pain since Tuesday morning        HPI:   Margot presents today to discuss the following.    Acute left-sided low back pain with left-sided sciatica  Chronic issue  Flaring  Sneezed 4 days ago and suddenly felt a shooting pain down the left leg  Pain is persistent  Started taking left over Robaxin, which did help.   Taking ibuprofen which helps    Past Medical History:   Diagnosis Date    ASTHMA     since childhood; RAD vs allergies    History of gestational diabetes     Other hyperlipidemia 7/7/2019       Current Outpatient Medications Ordered in Epic   Medication Sig Dispense Refill    meloxicam (MOBIC) 15 MG tablet Take 1 Tablet by mouth every day. 30 Tablet 1    cyclobenzaprine (FLEXERIL) 10 mg Tab Take 1 Tablet by mouth 3 times a day as needed for Muscle Spasms. 30 Tablet 1    erythromycin 5 MG/GM Ointment Apply 1 Application. to both eyes at bedtime. 3.5 g 0    SUMAtriptan (IMITREX) 50 MG Tab TAKE 1 TABLET BY MOUTH ONE TIME AS NEEDED FOR MIGRAINE FOR UP TO 1 DOSE.-INS MAX 9/30 9 Tablet 4    topiramate (TOPAMAX) 50 MG tablet Take 1 Tablet by mouth every day. 90 Tablet 1    fluticasone (FLONASE) 50 MCG/ACT nasal spray ADMINISTER 1 SPRAY INTO AFFECTED NOSTRIL(S) 2 TIMES A DAY. 48 mL 1    albuterol 108 (90 Base) MCG/ACT Aero Soln inhalation aerosol Inhale 2 Puffs every four hours as needed for Shortness of Breath. 1 Each 3    azelastine (ASTELIN) 137 MCG/SPRAY nasal spray ADMINISTER 1 SPRAY INTO AFFECTED NOSTRIL(S) 2 TIMES A DAY. 30 g 3     No current [facility]-ordered facility-administered medications on file.       Allergies:  Bloodless    Health Maintenance: Completed    ROS:  Gen: no fevers/chills, no changes in weight  Eyes: no changes in vision  Pulm: no sob, no cough  CV: no chest pain, no palpitations  GI: no nausea/vomiting, no diarrhea      Objective:     Exam:  /62 (BP Location: Left arm, Patient Position:  Sitting, BP Cuff Size: Adult)   Pulse 74   Temp 37.3 °C (99.1 °F) (Temporal)   SpO2 97%  There is no height or weight on file to calculate BMI.        Constitutional: Alert, no distress, well-groomed.  Skin: Warm, dry, good turgor, no rashes in visible areas.  Eye: Equal, round and reactive, conjunctiva clear, lids normal.  ENMT: Lips without lesions, good dentition, moist mucous membranes.  Neck: Trachea midline, no masses, no thyromegaly.  Respiratory: Unlabored respiratory effort, no cough.  MSK: Normal gait, moves all extremities.  Neuro: Grossly non-focal.   Psych: Alert and oriented x3, normal affect and mood.        Assessment & Plan:     38 y.o. female with the following -     1. Acute left-sided low back pain with left-sided sciatica  Chronic, flaring condition.  At this time I would like to up medications with meloxicam at 15 mg daily.  May take Tylenol for breakthrough pain.  I will also provide prescription for Flexeril 3 times daily.  Return precautions recommended.  - meloxicam (MOBIC) 15 MG tablet; Take 1 Tablet by mouth every day.  Dispense: 30 Tablet; Refill: 1      No follow-ups on file.          Please note that this dictation was created using voice recognition software. I have made every reasonable attempt to correct obvious errors, but I expect that there are errors of grammar and possibly content that I did not discover before finalizing the note.

## 2023-06-23 NOTE — ASSESSMENT & PLAN NOTE
Chronic issue  Flaring  Sneezed 4 days ago and suddenly felt a shooting pain down the left leg  Pain is persistent  Started taking left over Robaxin, which did help.   Taking ibuprofen which helps

## 2023-08-08 ENCOUNTER — APPOINTMENT (OUTPATIENT)
Dept: MEDICAL GROUP | Facility: PHYSICIAN GROUP | Age: 38
End: 2023-08-08
Payer: COMMERCIAL

## 2023-08-09 ENCOUNTER — HOSPITAL ENCOUNTER (OUTPATIENT)
Dept: LAB | Facility: MEDICAL CENTER | Age: 38
End: 2023-08-09
Attending: FAMILY MEDICINE
Payer: COMMERCIAL

## 2023-08-09 DIAGNOSIS — Z00.00 WELL WOMAN EXAM (NO GYNECOLOGICAL EXAM): ICD-10-CM

## 2023-08-09 LAB
ALBUMIN SERPL BCP-MCNC: 4.5 G/DL (ref 3.2–4.9)
ALBUMIN/GLOB SERPL: 1.3 G/DL
ALP SERPL-CCNC: 83 U/L (ref 30–99)
ALT SERPL-CCNC: 21 U/L (ref 2–50)
ANION GAP SERPL CALC-SCNC: 12 MMOL/L (ref 7–16)
AST SERPL-CCNC: 15 U/L (ref 12–45)
BILIRUB SERPL-MCNC: 0.3 MG/DL (ref 0.1–1.5)
BUN SERPL-MCNC: 9 MG/DL (ref 8–22)
CALCIUM ALBUM COR SERPL-MCNC: 9.1 MG/DL (ref 8.5–10.5)
CALCIUM SERPL-MCNC: 9.5 MG/DL (ref 8.5–10.5)
CHLORIDE SERPL-SCNC: 103 MMOL/L (ref 96–112)
CHOLEST SERPL-MCNC: 212 MG/DL (ref 100–199)
CO2 SERPL-SCNC: 23 MMOL/L (ref 20–33)
CREAT SERPL-MCNC: 0.56 MG/DL (ref 0.5–1.4)
ERYTHROCYTE [DISTWIDTH] IN BLOOD BY AUTOMATED COUNT: 41.5 FL (ref 35.9–50)
EST. AVERAGE GLUCOSE BLD GHB EST-MCNC: 123 MG/DL
FASTING STATUS PATIENT QL REPORTED: NORMAL
GFR SERPLBLD CREATININE-BSD FMLA CKD-EPI: 119 ML/MIN/1.73 M 2
GLOBULIN SER CALC-MCNC: 3.5 G/DL (ref 1.9–3.5)
GLUCOSE SERPL-MCNC: 105 MG/DL (ref 65–99)
HBA1C MFR BLD: 5.9 % (ref 4–5.6)
HCT VFR BLD AUTO: 42.1 % (ref 37–47)
HDLC SERPL-MCNC: 36 MG/DL
HGB BLD-MCNC: 13.8 G/DL (ref 12–16)
LDLC SERPL CALC-MCNC: 149 MG/DL
MCH RBC QN AUTO: 29.4 PG (ref 27–33)
MCHC RBC AUTO-ENTMCNC: 32.8 G/DL (ref 32.2–35.5)
MCV RBC AUTO: 89.6 FL (ref 81.4–97.8)
PLATELET # BLD AUTO: 328 K/UL (ref 164–446)
PMV BLD AUTO: 10.9 FL (ref 9–12.9)
POTASSIUM SERPL-SCNC: 4.4 MMOL/L (ref 3.6–5.5)
PROT SERPL-MCNC: 8 G/DL (ref 6–8.2)
RBC # BLD AUTO: 4.7 M/UL (ref 4.2–5.4)
SODIUM SERPL-SCNC: 138 MMOL/L (ref 135–145)
TRIGL SERPL-MCNC: 136 MG/DL (ref 0–149)
WBC # BLD AUTO: 8.8 K/UL (ref 4.8–10.8)

## 2023-08-09 PROCEDURE — 80053 COMPREHEN METABOLIC PANEL: CPT

## 2023-08-09 PROCEDURE — 85027 COMPLETE CBC AUTOMATED: CPT

## 2023-08-09 PROCEDURE — 83036 HEMOGLOBIN GLYCOSYLATED A1C: CPT

## 2023-08-09 PROCEDURE — 80061 LIPID PANEL: CPT

## 2023-08-09 PROCEDURE — 36415 COLL VENOUS BLD VENIPUNCTURE: CPT

## 2023-08-10 DIAGNOSIS — G43.109 MIGRAINE WITH AURA AND WITHOUT STATUS MIGRAINOSUS, NOT INTRACTABLE: ICD-10-CM

## 2023-08-10 RX ORDER — SUMATRIPTAN 50 MG/1
TABLET, FILM COATED ORAL
Qty: 9 TABLET | Refills: 0 | Status: SHIPPED | OUTPATIENT
Start: 2023-08-10 | End: 2023-09-08

## 2023-08-14 ENCOUNTER — OFFICE VISIT (OUTPATIENT)
Dept: MEDICAL GROUP | Facility: PHYSICIAN GROUP | Age: 38
End: 2023-08-14
Payer: COMMERCIAL

## 2023-08-14 VITALS
HEART RATE: 75 BPM | TEMPERATURE: 99.3 F | WEIGHT: 157 LBS | DIASTOLIC BLOOD PRESSURE: 70 MMHG | OXYGEN SATURATION: 98 % | BODY MASS INDEX: 28.89 KG/M2 | HEIGHT: 62 IN | SYSTOLIC BLOOD PRESSURE: 122 MMHG

## 2023-08-14 DIAGNOSIS — E78.49 OTHER HYPERLIPIDEMIA: ICD-10-CM

## 2023-08-14 DIAGNOSIS — R73.03 PREDIABETES: ICD-10-CM

## 2023-08-14 PROCEDURE — 3074F SYST BP LT 130 MM HG: CPT | Performed by: FAMILY MEDICINE

## 2023-08-14 PROCEDURE — 3078F DIAST BP <80 MM HG: CPT | Performed by: FAMILY MEDICINE

## 2023-08-14 PROCEDURE — 99213 OFFICE O/P EST LOW 20 MIN: CPT | Performed by: FAMILY MEDICINE

## 2023-08-14 ASSESSMENT — FIBROSIS 4 INDEX: FIB4 SCORE: 0.38

## 2023-08-14 NOTE — PROGRESS NOTES
"Subjective:     Chief Complaint   Patient presents with    Lab Results       HPI:   Margot presents today to discuss the following.    Prediabetes  Chronic issue  a1c up at 5.9% this month   Gained 5lbs since 4 mo ago  Not working out  Not working on diet    Other hyperlipidemia  Chronic issue  Elevated this month     Past Medical History:   Diagnosis Date    ASTHMA     since childhood; RAD vs allergies    History of gestational diabetes     Other hyperlipidemia 7/7/2019       Current Outpatient Medications Ordered in Epic   Medication Sig Dispense Refill    SUMAtriptan (IMITREX) 50 MG Tab TAKE 1 TABLET BY MOUTH ONE TIME AS NEEDED FOR MIGRAINE FOR UP TO 1 DOSE.-INS MAX 9/30 9 Tablet 0    meloxicam (MOBIC) 15 MG tablet Take 1 Tablet by mouth every day. 30 Tablet 1    cyclobenzaprine (FLEXERIL) 10 mg Tab Take 1 Tablet by mouth 3 times a day as needed for Muscle Spasms. 30 Tablet 1    topiramate (TOPAMAX) 50 MG tablet Take 1 Tablet by mouth every day. 90 Tablet 1    fluticasone (FLONASE) 50 MCG/ACT nasal spray ADMINISTER 1 SPRAY INTO AFFECTED NOSTRIL(S) 2 TIMES A DAY. 48 mL 1    albuterol 108 (90 Base) MCG/ACT Aero Soln inhalation aerosol Inhale 2 Puffs every four hours as needed for Shortness of Breath. 1 Each 3    azelastine (ASTELIN) 137 MCG/SPRAY nasal spray ADMINISTER 1 SPRAY INTO AFFECTED NOSTRIL(S) 2 TIMES A DAY. 30 g 3     No current [facility]-ordered facility-administered medications on file.       Allergies:  Bloodless    Health Maintenance: Completed    ROS:  Gen: no fevers/chills, no changes in weight  Eyes: no changes in vision  Pulm: no sob, no cough  CV: no chest pain, no palpitations  GI: no nausea/vomiting, no diarrhea      Objective:     Exam:  /70 (BP Location: Right arm, Patient Position: Sitting, BP Cuff Size: Adult)   Pulse 75   Temp 37.4 °C (99.3 °F) (Temporal)   Ht 1.575 m (5' 2\")   Wt 71.2 kg (157 lb)   SpO2 98%   BMI 28.72 kg/m²  Body mass index is 28.72 kg/m².    Constitutional: " Alert, no distress, well-groomed.  Skin: Warm, dry, good turgor, no rashes in visible areas.  Eye: Equal, round and reactive, conjunctiva clear, lids normal.  ENMT: Lips without lesions, good dentition, moist mucous membranes.  Neck: Trachea midline, no masses, no thyromegaly.  Respiratory: Unlabored respiratory effort, no cough.  MSK: Normal gait, moves all extremities.  Neuro: Grossly non-focal.   Psych: Alert and oriented x3, normal affect and mood.        Assessment & Plan:     38 y.o. female with the following -     1. Prediabetes  Chronic, worsening condition.  A1c escalated to 5.9%.  At this time I recommend continuing to reinforce lifestyle change and increasing exercise.  She verbalized understanding.    2. Other hyperlipidemia  Chronic worsening condition.  I recommend reinforcing lifestyle changes above.      No follow-ups on file.          Please note that this dictation was created using voice recognition software. I have made every reasonable attempt to correct obvious errors, but I expect that there are errors of grammar and possibly content that I did not discover before finalizing the note.

## 2023-08-14 NOTE — ASSESSMENT & PLAN NOTE
Chronic issue  a1c up at 5.9% this month   Gained 5lbs since 4 mo ago  Not working out  Not working on diet

## 2023-08-28 DIAGNOSIS — M54.42 ACUTE LEFT-SIDED LOW BACK PAIN WITH LEFT-SIDED SCIATICA: ICD-10-CM

## 2023-08-28 RX ORDER — MELOXICAM 15 MG/1
15 TABLET ORAL
Qty: 30 TABLET | Refills: 0 | Status: SHIPPED | OUTPATIENT
Start: 2023-08-28

## 2023-08-30 ENCOUNTER — DOCUMENTATION (OUTPATIENT)
Dept: HEALTH INFORMATION MANAGEMENT | Facility: OTHER | Age: 38
End: 2023-08-30
Payer: COMMERCIAL

## 2023-09-08 DIAGNOSIS — G43.109 MIGRAINE WITH AURA AND WITHOUT STATUS MIGRAINOSUS, NOT INTRACTABLE: ICD-10-CM

## 2023-09-08 RX ORDER — SUMATRIPTAN 50 MG/1
TABLET, FILM COATED ORAL
Qty: 9 TABLET | Refills: 0 | Status: SHIPPED | OUTPATIENT
Start: 2023-09-08 | End: 2023-10-09 | Stop reason: SDUPTHER

## 2023-09-25 DIAGNOSIS — M54.42 ACUTE LEFT-SIDED LOW BACK PAIN WITH LEFT-SIDED SCIATICA: ICD-10-CM

## 2023-09-25 RX ORDER — MELOXICAM 15 MG/1
15 TABLET ORAL
Qty: 30 TABLET | Refills: 0 | OUTPATIENT
Start: 2023-09-25

## 2023-10-09 DIAGNOSIS — G43.109 MIGRAINE WITH AURA AND WITHOUT STATUS MIGRAINOSUS, NOT INTRACTABLE: ICD-10-CM

## 2023-10-09 RX ORDER — SUMATRIPTAN 50 MG/1
TABLET, FILM COATED ORAL
Qty: 27 TABLET | Refills: 0 | Status: SHIPPED | OUTPATIENT
Start: 2023-10-09

## 2023-10-09 NOTE — TELEPHONE ENCOUNTER
PLEASE CONTACT PATIENT.    Short supply has been provided, but they need to establish with a new provider for future refills.

## 2023-11-09 ENCOUNTER — TELEPHONE (OUTPATIENT)
Dept: HEALTH INFORMATION MANAGEMENT | Facility: OTHER | Age: 38
End: 2023-11-09
Payer: COMMERCIAL

## 2024-05-10 ENCOUNTER — HOSPITAL ENCOUNTER (OUTPATIENT)
Dept: LAB | Facility: MEDICAL CENTER | Age: 39
End: 2024-05-10
Attending: FAMILY MEDICINE
Payer: COMMERCIAL

## 2024-05-10 LAB
ALBUMIN SERPL BCP-MCNC: 4.5 G/DL (ref 3.2–4.9)
ALBUMIN/GLOB SERPL: 1.5 G/DL
ALP SERPL-CCNC: 80 U/L (ref 30–99)
ALT SERPL-CCNC: 20 U/L (ref 2–50)
ANION GAP SERPL CALC-SCNC: 11 MMOL/L (ref 7–16)
AST SERPL-CCNC: 15 U/L (ref 12–45)
BASOPHILS # BLD AUTO: 0.6 % (ref 0–1.8)
BASOPHILS # BLD: 0.04 K/UL (ref 0–0.12)
BILIRUB SERPL-MCNC: 0.4 MG/DL (ref 0.1–1.5)
BUN SERPL-MCNC: 9 MG/DL (ref 8–22)
CALCIUM ALBUM COR SERPL-MCNC: 8.5 MG/DL (ref 8.5–10.5)
CALCIUM SERPL-MCNC: 8.9 MG/DL (ref 8.5–10.5)
CHLORIDE SERPL-SCNC: 103 MMOL/L (ref 96–112)
CHOLEST SERPL-MCNC: 216 MG/DL (ref 100–199)
CO2 SERPL-SCNC: 23 MMOL/L (ref 20–33)
CREAT SERPL-MCNC: 0.63 MG/DL (ref 0.5–1.4)
EOSINOPHIL # BLD AUTO: 0.28 K/UL (ref 0–0.51)
EOSINOPHIL NFR BLD: 4.5 % (ref 0–6.9)
ERYTHROCYTE [DISTWIDTH] IN BLOOD BY AUTOMATED COUNT: 44.4 FL (ref 35.9–50)
EST. AVERAGE GLUCOSE BLD GHB EST-MCNC: 114 MG/DL
FASTING STATUS PATIENT QL REPORTED: NORMAL
GFR SERPLBLD CREATININE-BSD FMLA CKD-EPI: 115 ML/MIN/1.73 M 2
GLOBULIN SER CALC-MCNC: 3.1 G/DL (ref 1.9–3.5)
GLUCOSE SERPL-MCNC: 110 MG/DL (ref 65–99)
HBA1C MFR BLD: 5.6 % (ref 4–5.6)
HCT VFR BLD AUTO: 43.5 % (ref 37–47)
HDLC SERPL-MCNC: 39 MG/DL
HGB BLD-MCNC: 14 G/DL (ref 12–16)
IMM GRANULOCYTES # BLD AUTO: 0.02 K/UL (ref 0–0.11)
IMM GRANULOCYTES NFR BLD AUTO: 0.3 % (ref 0–0.9)
LDLC SERPL CALC-MCNC: 147 MG/DL
LYMPHOCYTES # BLD AUTO: 1.7 K/UL (ref 1–4.8)
LYMPHOCYTES NFR BLD: 27.6 % (ref 22–41)
MCH RBC QN AUTO: 30.1 PG (ref 27–33)
MCHC RBC AUTO-ENTMCNC: 32.2 G/DL (ref 32.2–35.5)
MCV RBC AUTO: 93.5 FL (ref 81.4–97.8)
MONOCYTES # BLD AUTO: 0.47 K/UL (ref 0–0.85)
MONOCYTES NFR BLD AUTO: 7.6 % (ref 0–13.4)
NEUTROPHILS # BLD AUTO: 3.65 K/UL (ref 1.82–7.42)
NEUTROPHILS NFR BLD: 59.4 % (ref 44–72)
NRBC # BLD AUTO: 0 K/UL
NRBC BLD-RTO: 0 /100 WBC (ref 0–0.2)
PLATELET # BLD AUTO: 329 K/UL (ref 164–446)
PMV BLD AUTO: 10.4 FL (ref 9–12.9)
POTASSIUM SERPL-SCNC: 4 MMOL/L (ref 3.6–5.5)
PROT SERPL-MCNC: 7.6 G/DL (ref 6–8.2)
RBC # BLD AUTO: 4.65 M/UL (ref 4.2–5.4)
SODIUM SERPL-SCNC: 137 MMOL/L (ref 135–145)
TRIGL SERPL-MCNC: 148 MG/DL (ref 0–149)
WBC # BLD AUTO: 6.2 K/UL (ref 4.8–10.8)

## 2024-06-15 ENCOUNTER — HOSPITAL ENCOUNTER (OUTPATIENT)
Dept: LAB | Facility: MEDICAL CENTER | Age: 39
End: 2024-06-15
Attending: FAMILY MEDICINE
Payer: COMMERCIAL

## 2024-06-15 LAB
CHOLEST SERPL-MCNC: 226 MG/DL (ref 100–199)
HDLC SERPL-MCNC: 42 MG/DL
LDLC SERPL CALC-MCNC: 158 MG/DL
TRIGL SERPL-MCNC: 132 MG/DL (ref 0–149)
TSH SERPL DL<=0.005 MIU/L-ACNC: 1.17 UIU/ML (ref 0.38–5.33)

## 2024-06-15 PROCEDURE — 36415 COLL VENOUS BLD VENIPUNCTURE: CPT

## 2024-06-15 PROCEDURE — 84443 ASSAY THYROID STIM HORMONE: CPT

## 2024-06-15 PROCEDURE — 80061 LIPID PANEL: CPT
